# Patient Record
Sex: FEMALE | Race: WHITE | NOT HISPANIC OR LATINO | Employment: OTHER | URBAN - METROPOLITAN AREA
[De-identification: names, ages, dates, MRNs, and addresses within clinical notes are randomized per-mention and may not be internally consistent; named-entity substitution may affect disease eponyms.]

---

## 2017-05-21 ENCOUNTER — TELEPHONE (OUTPATIENT)
Dept: MEDICAL GROUP | Facility: PHYSICIAN GROUP | Age: 82
End: 2017-05-21

## 2017-05-22 NOTE — TELEPHONE ENCOUNTER
"I was asked by the patient to stop by her home today to do paperwork certifying that she was still of sound mind and able to make her own legal, medical, and financial decisions.  I found her to be alert, oriented and quite cognizant of my visit.  She agreed with my assessment and review of her medications, and said she was feeling \"fine\".  She still has her sense of humor and told me she wanted to make changes in her Will so that all of her assets would be given to various charities of her choice upon her death.   She is of sound mind and is competent and capable of making her own legal, medical, and financial decisions.  I completed paperwork for her to this effect today.  It was a delight to see Katerina again.    Yoni Marino M.D.        "

## 2017-07-29 ENCOUNTER — APPOINTMENT (OUTPATIENT)
Dept: RADIOLOGY | Facility: MEDICAL CENTER | Age: 82
DRG: 689 | End: 2017-07-29
Attending: EMERGENCY MEDICINE
Payer: MEDICARE

## 2017-07-29 ENCOUNTER — RESOLUTE PROFESSIONAL BILLING HOSPITAL PROF FEE (OUTPATIENT)
Dept: HOSPITALIST | Facility: MEDICAL CENTER | Age: 82
End: 2017-07-29
Payer: MEDICARE

## 2017-07-29 ENCOUNTER — HOSPITAL ENCOUNTER (INPATIENT)
Facility: MEDICAL CENTER | Age: 82
LOS: 3 days | DRG: 689 | End: 2017-08-01
Attending: EMERGENCY MEDICINE | Admitting: HOSPITALIST
Payer: MEDICARE
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
## 2017-07-29 DIAGNOSIS — R55 SYNCOPE, UNSPECIFIED SYNCOPE TYPE: ICD-10-CM

## 2017-07-29 PROBLEM — N39.0 UTI (URINARY TRACT INFECTION): Status: ACTIVE | Noted: 2017-07-29

## 2017-07-29 LAB
ALBUMIN SERPL BCP-MCNC: 3.9 G/DL (ref 3.2–4.9)
ALBUMIN/GLOB SERPL: 1.4 G/DL
ALP SERPL-CCNC: 67 U/L (ref 30–99)
ALT SERPL-CCNC: 15 U/L (ref 2–50)
ANION GAP SERPL CALC-SCNC: 6 MMOL/L (ref 0–11.9)
APPEARANCE UR: CLEAR
APTT PPP: 23.3 SEC (ref 24.7–36)
AST SERPL-CCNC: 17 U/L (ref 12–45)
BACTERIA #/AREA URNS HPF: NEGATIVE /HPF
BASOPHILS # BLD AUTO: 0.4 % (ref 0–1.8)
BASOPHILS # BLD: 0.04 K/UL (ref 0–0.12)
BILIRUB SERPL-MCNC: 0.4 MG/DL (ref 0.1–1.5)
BILIRUB UR QL STRIP.AUTO: NEGATIVE
BUN SERPL-MCNC: 34 MG/DL (ref 8–22)
CALCIUM SERPL-MCNC: 9.5 MG/DL (ref 8.5–10.5)
CHLORIDE SERPL-SCNC: 104 MMOL/L (ref 96–112)
CO2 SERPL-SCNC: 24 MMOL/L (ref 20–33)
COLOR UR: YELLOW
CREAT SERPL-MCNC: 1.08 MG/DL (ref 0.5–1.4)
CULTURE IF INDICATED INDCX: YES UA CULTURE
EKG IMPRESSION: NORMAL
EOSINOPHIL # BLD AUTO: 0.2 K/UL (ref 0–0.51)
EOSINOPHIL NFR BLD: 1.9 % (ref 0–6.9)
EPI CELLS #/AREA URNS HPF: ABNORMAL /HPF
ERYTHROCYTE [DISTWIDTH] IN BLOOD BY AUTOMATED COUNT: 44.5 FL (ref 35.9–50)
GFR SERPL CREATININE-BSD FRML MDRD: 47 ML/MIN/1.73 M 2
GLOBULIN SER CALC-MCNC: 2.8 G/DL (ref 1.9–3.5)
GLUCOSE SERPL-MCNC: 91 MG/DL (ref 65–99)
GLUCOSE UR STRIP.AUTO-MCNC: NEGATIVE MG/DL
HCT VFR BLD AUTO: 45.6 % (ref 37–47)
HGB BLD-MCNC: 15 G/DL (ref 12–16)
HYALINE CASTS #/AREA URNS LPF: ABNORMAL /LPF
IMM GRANULOCYTES # BLD AUTO: 0.05 K/UL (ref 0–0.11)
IMM GRANULOCYTES NFR BLD AUTO: 0.5 % (ref 0–0.9)
KETONES UR STRIP.AUTO-MCNC: NEGATIVE MG/DL
LACTATE BLD-SCNC: 1.5 MMOL/L (ref 0.5–2)
LEUKOCYTE ESTERASE UR QL STRIP.AUTO: ABNORMAL
LYMPHOCYTES # BLD AUTO: 3.26 K/UL (ref 1–4.8)
LYMPHOCYTES NFR BLD: 31.5 % (ref 22–41)
MAGNESIUM SERPL-MCNC: 2.2 MG/DL (ref 1.5–2.5)
MCH RBC QN AUTO: 31.1 PG (ref 27–33)
MCHC RBC AUTO-ENTMCNC: 32.9 G/DL (ref 33.6–35)
MCV RBC AUTO: 94.4 FL (ref 81.4–97.8)
MICRO URNS: ABNORMAL
MONOCYTES # BLD AUTO: 0.88 K/UL (ref 0–0.85)
MONOCYTES NFR BLD AUTO: 8.5 % (ref 0–13.4)
NEUTROPHILS # BLD AUTO: 5.93 K/UL (ref 2–7.15)
NEUTROPHILS NFR BLD: 57.2 % (ref 44–72)
NITRITE UR QL STRIP.AUTO: NEGATIVE
NRBC # BLD AUTO: 0 K/UL
NRBC BLD AUTO-RTO: 0 /100 WBC
PH UR STRIP.AUTO: 7 [PH]
PHOSPHATE SERPL-MCNC: 3.1 MG/DL (ref 2.5–4.5)
PLATELET # BLD AUTO: 254 K/UL (ref 164–446)
PMV BLD AUTO: 9.2 FL (ref 9–12.9)
POTASSIUM SERPL-SCNC: 3.8 MMOL/L (ref 3.6–5.5)
PROT SERPL-MCNC: 6.7 G/DL (ref 6–8.2)
PROT UR QL STRIP: NEGATIVE MG/DL
RBC # BLD AUTO: 4.83 M/UL (ref 4.2–5.4)
RBC # URNS HPF: ABNORMAL /HPF
RBC UR QL AUTO: ABNORMAL
SODIUM SERPL-SCNC: 134 MMOL/L (ref 135–145)
SP GR UR STRIP.AUTO: 1.02
TROPONIN I SERPL-MCNC: 0.01 NG/ML (ref 0–0.04)
UROBILINOGEN UR STRIP.AUTO-MCNC: 0.2 MG/DL
WBC # BLD AUTO: 10.4 K/UL (ref 4.8–10.8)
WBC #/AREA URNS HPF: ABNORMAL /HPF

## 2017-07-29 PROCEDURE — 70450 CT HEAD/BRAIN W/O DYE: CPT

## 2017-07-29 PROCEDURE — 83735 ASSAY OF MAGNESIUM: CPT

## 2017-07-29 PROCEDURE — 87086 URINE CULTURE/COLONY COUNT: CPT

## 2017-07-29 PROCEDURE — 81001 URINALYSIS AUTO W/SCOPE: CPT

## 2017-07-29 PROCEDURE — 36415 COLL VENOUS BLD VENIPUNCTURE: CPT

## 2017-07-29 PROCEDURE — 93005 ELECTROCARDIOGRAM TRACING: CPT | Performed by: EMERGENCY MEDICINE

## 2017-07-29 PROCEDURE — 85025 COMPLETE CBC W/AUTO DIFF WBC: CPT

## 2017-07-29 PROCEDURE — 85730 THROMBOPLASTIN TIME PARTIAL: CPT

## 2017-07-29 PROCEDURE — 83605 ASSAY OF LACTIC ACID: CPT

## 2017-07-29 PROCEDURE — 84100 ASSAY OF PHOSPHORUS: CPT

## 2017-07-29 PROCEDURE — 71010 DX-CHEST-LIMITED (1 VIEW): CPT

## 2017-07-29 PROCEDURE — 84484 ASSAY OF TROPONIN QUANT: CPT

## 2017-07-29 PROCEDURE — 770020 HCHG ROOM/CARE - TELE (206)

## 2017-07-29 PROCEDURE — 99223 1ST HOSP IP/OBS HIGH 75: CPT | Performed by: HOSPITALIST

## 2017-07-29 PROCEDURE — 73502 X-RAY EXAM HIP UNI 2-3 VIEWS: CPT | Mod: LT

## 2017-07-29 PROCEDURE — 72125 CT NECK SPINE W/O DYE: CPT

## 2017-07-29 PROCEDURE — 99285 EMERGENCY DEPT VISIT HI MDM: CPT

## 2017-07-29 PROCEDURE — 80053 COMPREHEN METABOLIC PANEL: CPT

## 2017-07-29 RX ORDER — LISINOPRIL 20 MG/1
20 TABLET ORAL
Status: DISCONTINUED | OUTPATIENT
Start: 2017-07-30 | End: 2017-08-01 | Stop reason: HOSPADM

## 2017-07-29 RX ORDER — ONDANSETRON 4 MG/1
4 TABLET, ORALLY DISINTEGRATING ORAL EVERY 4 HOURS PRN
Status: DISCONTINUED | OUTPATIENT
Start: 2017-07-29 | End: 2017-08-01 | Stop reason: HOSPADM

## 2017-07-29 RX ORDER — CALCIUM POLYCARBOPHIL 625 MG
1 TABLET ORAL DAILY
Status: DISCONTINUED | OUTPATIENT
Start: 2017-07-30 | End: 2017-07-29

## 2017-07-29 RX ORDER — POLYETHYLENE GLYCOL 3350 17 G/17G
1 POWDER, FOR SOLUTION ORAL
Status: DISCONTINUED | OUTPATIENT
Start: 2017-07-29 | End: 2017-08-01 | Stop reason: HOSPADM

## 2017-07-29 RX ORDER — ACETAMINOPHEN 325 MG/1
650 TABLET ORAL EVERY 6 HOURS PRN
Status: DISCONTINUED | OUTPATIENT
Start: 2017-07-29 | End: 2017-08-01 | Stop reason: HOSPADM

## 2017-07-29 RX ORDER — AMLODIPINE BESYLATE 5 MG/1
2.5 TABLET ORAL
Status: DISCONTINUED | OUTPATIENT
Start: 2017-07-30 | End: 2017-08-01 | Stop reason: HOSPADM

## 2017-07-29 RX ORDER — SODIUM CHLORIDE 9 MG/ML
INJECTION, SOLUTION INTRAVENOUS CONTINUOUS
Status: DISCONTINUED | OUTPATIENT
Start: 2017-07-29 | End: 2017-07-31

## 2017-07-29 RX ORDER — BISACODYL 10 MG
10 SUPPOSITORY, RECTAL RECTAL
Status: DISCONTINUED | OUTPATIENT
Start: 2017-07-29 | End: 2017-08-01 | Stop reason: HOSPADM

## 2017-07-29 RX ORDER — ONDANSETRON 2 MG/ML
4 INJECTION INTRAMUSCULAR; INTRAVENOUS EVERY 4 HOURS PRN
Status: DISCONTINUED | OUTPATIENT
Start: 2017-07-29 | End: 2017-08-01 | Stop reason: HOSPADM

## 2017-07-29 RX ORDER — AMOXICILLIN 250 MG
2 CAPSULE ORAL 2 TIMES DAILY
Status: DISCONTINUED | OUTPATIENT
Start: 2017-07-30 | End: 2017-08-01 | Stop reason: HOSPADM

## 2017-07-29 RX ORDER — LORAZEPAM 2 MG/ML
0.5 INJECTION INTRAMUSCULAR EVERY 6 HOURS PRN
Status: DISCONTINUED | OUTPATIENT
Start: 2017-07-29 | End: 2017-07-31

## 2017-07-29 RX ORDER — LORAZEPAM 1 MG/1
0.5 TABLET ORAL EVERY 6 HOURS PRN
Status: DISCONTINUED | OUTPATIENT
Start: 2017-07-29 | End: 2017-07-31

## 2017-07-29 ASSESSMENT — PAIN SCALES - GENERAL
PAINLEVEL_OUTOF10: 0

## 2017-07-29 NOTE — ED NOTES
"Pt bib ems from home.  Chief Complaint   Patient presents with   • Syncope     Pt states she walking in the kitchen and \"passed out and fell backwards\" reports she hit her head on the floor   • Hip Pain     left     Pt denies a prior episode of the same. Pt arrives in a c-collar and on a back board. Pt log rolled of backboard. C-collar remains in place.  Pt DOOLEY. Denies numbness/tingling.  Pt in a gown, connected to monitor, chart up for ERP.  "

## 2017-07-29 NOTE — IP AVS SNAPSHOT
Piqniq Access Code: [email or web address]  Expires: 8/31/2017 12:59 PM    Your email address is not on file at [facility].  Email Addresses are required for you to sign up for Piqniq, please contact [phone] to verify your personal information and to provide your email address prior to attempting to register for Piqniq.    [place] [address]    Piqniq  A secure, online tool to manage your health information     [facility]’s Piqniq® is a secure, online tool that connects you to your personalized health information from the privacy of your home -- day or night - making it very easy for you to manage your healthcare. Once the activation process is completed, you can even access your medical information using the Piqniq gwendolyn, which is available for free in the Apple Gwendolyn store or Google Play store.     To learn more about Piqniq, visit [email or web address]    There are two levels of access available (as shown below):   My Chart Features  [facility] Primary Care Doctor [facility]  Specialists [facility]  Urgent  Care Non-[facility] Primary Care Doctor   Email your healthcare team securely and privately 24/7 X X X    Manage appointments: schedule your next appointment; view details of past/upcoming appointments X      Request prescription refills. X      View recent personal medical records, including lab and immunizations X X X X   View health record, including health history, allergies, medications X X X X   Read reports about your outpatient visits, procedures, consult and ER notes X X X X   See your discharge summary, which is a recap of your hospital and/or ER visit that includes your diagnosis, lab results, and care plan X X  X     How to register for Piqniq:  Once your e-mail address has been verified, follow the following steps to sign up for Piqniq.     1. Go to  [email or web address]  2. Click on the Sign Up Now box, which takes you to the New Member Sign Up page. You will  need to provide the following information:  a. Enter your [facility] Access Code exactly as it appears at the top of this page. (You will not need to use this code after you’ve completed the sign-up process. If you do not sign up before the expiration date, you must request a new code.)   b. Enter your date of birth.   c. Enter your home email address.   d. Click Submit, and follow the next screen’s instructions.  3. Create a [employer] ID. This will be your [facility] login ID and cannot be changed, so think of one that is secure and easy to remember.  4. Create a [facility] password. You can change your password at any time.  5. Enter your Password Reset Question and Answer. This can be used at a later time if you forget your password.   6. Enter your e-mail address. This allows you to receive e-mail notifications when new information is available in [facility].  7. Click Sign Up. You can now view your health information.    For assistance activating your [facility] account, call [phone]

## 2017-07-29 NOTE — IP AVS SNAPSHOT
8/1/2017    Katerina Joseph  [address]  [facility] [address]    Dear Katerina:    [facility] wants to ensure your discharge home is safe and you or your loved ones have had all of your questions answered regarding your care after you leave the hospital.    Below is a list of resources and contact information should you have any questions regarding your hospital stay, follow-up instructions, or active medical symptoms.    Questions or Concerns Regarding… Contact   Medical Questions Related to Your Discharge  (7 days a week, 8am-5pm) Contact a Nurse Care Coordinator   [phone]   Medical Questions Not Related to Your Discharge  (24 hours a day / 7 days a week)  Contact the Nurse Health Line   [phone]    Medications or Discharge Instructions Refer to your discharge packet   or contact your [facility] Primary Care Provider   [phone]   Follow-up Appointment(s) Schedule your appointment via [employer]   or contact Scheduling [phone]   Billing Review your statement via [employer]  or contact Billing [phone]   Medical Records Review your records via [employer]   or contact Medical Records [phone]     You may receive a telephone call within two days of discharge. This call is to make certain you understand your discharge instructions and have the opportunity to have any questions answered. You can also easily access your medical information, test results and upcoming appointments via the [employer] free online health management tool. You can learn more and sign up at [employer]. For assistance setting up your [employer] [facility] [employer] account, please call [phone].    Once again, we want to ensure your discharge home is safe and that you have a clear understanding of any next steps in your care. If you have any questions or concerns, please do not hesitate to contact us, we are here for you. Thank you for choosing [facility] for your healthcare needs.    Sincerely,    Your [facility] Healthcare Team

## 2017-07-29 NOTE — IP AVS SNAPSHOT
" <p align=\"LEFT\"><IMG SRC=\"//[email or web address]\" alt=\"Image\" WIDTH=\"50%\" HEIGHT=\"200\" BORDER=\"\"></p>                   Name:Katerina Joseph  Medical Record Number:[id number]  CSN: [id number]    YOB: 1919   Age: 89 y.o.  Sex: female  HT:1.702 m (5' 7\") WT: 78.9 kg (173 lb 15.1 oz)          Admit Date: 7/29/2017     Discharge Date:   Today's Date: 8/1/2017  Attending Doctor:  Shasta Simeon M.D.                  Allergies:  Review of patient's allergies indicates no known allergies.          Your appointments     Aug 02, 2017  2:40 PM   CARE MANAGER TELEPHONE VISIT with CARE MANAGER   [address] [id number] [phone]           ***IMPORTANT**** This is a phone visit only. Do not come into the office. The Care Manager will call you at the scheduled time for Care Manager Telephone Visit.            Aug 03, 2017  1:40 PM   Established Patient with Ally Macdonald M.D.   [address] [address]   [phone]           You will be receiving a confirmation call a few days before your appointment from our automated call confirmation system.                 Medication List      Take these Medications        Instructions    acetaminophen 325 MG Tabs   Commonly known as:  TYLENOL    Take 650 mg by mouth every four hours as needed.    Indications: Pain   Dose:  650 mg       amlodipine 2.5 MG Tabs   Commonly known as:  NORVASC    TAKE 1 TABLET BY MOUTH ONCE DAILY.       aspirin EC 81 MG Tbec   Commonly known as:  ECOTRIN    Take 81 mg by mouth every day.   Dose:  81 mg       Calcium Carbonate 600 MG Tabs    Take  by mouth.       cefdinir 300 MG Caps   Commonly known as:  OMNICEF    Take 1 Cap by mouth 2 times a day for 2 days.   Dose:  300 mg       CENTRUM ADULTS PO    Take  by mouth.       docusate sodium 100 MG Caps   Commonly known as:  COLACE    Take 100 mg by mouth 2 times a day.   Dose:  " 100 mg       Fiber 625 MG Tabs    Take 1 Tab by mouth every day.   Dose:  1 Tab       hydrochlorothiazide 25 MG Tabs   Commonly known as:  HYDRODIURIL    TAKE 1 TABLET BY MOUTH ONCE DAILY.       lisinopril 20 MG Tabs   Commonly known as:  PRINIVIL    TAKE (1) TABLET BY MOUTH TWICE DAILY.       magnesium hydroxide 400 MG/5ML Susp   Commonly known as:  MILK OF MAGNESIA    Take 30 mL by mouth 1 time daily as needed. For constipation.   Dose:  30 mL       pantoprazole 40 MG Tbec   Commonly known as:  PROTONIX    Take 80 mg by mouth every evening.   Dose:  80 mg       potassium chloride SA 20 MEQ Tbcr   Commonly known as:  Kdur    Take 20 mEq by mouth every day.   Dose:  20 mEq       Vitamin D3 3000 UNITS Tabs    Take 1,000 Units by mouth every day.   Dose:  1000 Units

## 2017-07-29 NOTE — IP AVS SNAPSHOT
" Home Care Instructions                                                                                                                  Name:Katerina Joseph  Medical Record Number:[id number]  CSN: [id number]    YOB: 1919   Age: 89 y.o.  Sex: female  HT:1.702 m (5' 7\") WT: 78.9 kg (173 lb 15.1 oz)          Admit Date: 7/29/2017     Discharge Date:   Today's Date: 8/1/2017  Attending Doctor:  Shasta Simeon M.D.                  Allergies:  Review of patient's allergies indicates no known allergies.            Discharge Instructions       Discharge Instructions    Discharged to home by car with friend. Discharged via wheelchair, hospital escort: Yes.  Special equipment needed: Not Applicable    Be sure to schedule a follow-up appointment with your primary care doctor or any specialists as instructed.     Discharge Plan:   Diet Plan: Discussed  Activity Level: Discussed  Confirmed Follow up Appointment: Appointment Scheduled  Confirmed Symptoms Management: Discussed  Medication Reconciliation Updated: Yes  Influenza Vaccine Indication: Patient Refuses    I understand that a diet low in cholesterol, fat, and sodium is recommended for good health. Unless I have been given specific instructions below for another diet, I accept this instruction as my diet prescription.   Other diet: low sodium, low cholesterol    Special Instructions: None    · Is patient discharged on Warfarin / Coumadin?   No     · Is patient Post Blood Transfusion?  No    Depression / Suicide Risk    As you are discharged from this [facility] facility, it is important to learn how to keep safe from harming yourself.    Recognize the warning signs:  · Abrupt changes in personality, positive or negative- including increase in energy   · Giving away possessions  · Change in eating patterns- significant weight changes-  positive or negative  · Change in sleeping patterns- unable to sleep or sleeping all the time   · Unwillingness or inability to " communicate  · Depression  · Unusual sadness, discouragement and loneliness  · Talk of wanting to die  · Neglect of personal appearance   · Rebelliousness- reckless behavior  · Withdrawal from people/activities they love  · Confusion- inability to concentrate     If you or a loved one observes any of these behaviors or has concerns about self-harm, here's what you can do:  · Talk about it- your feelings and reasons for harming yourself  · Remove any means that you might use to hurt yourself (examples: pills, rope, extension cords, firearm)  · Get professional help from the community (Mental Health, Substance Abuse, psychological counseling)  · Do not be alone:Call your Safe Contact- someone whom you trust who will be there for you.  · Call your local CRISIS HOTLINE 531-6751 or 261-730-5937  · Call your local Children's Mobile Crisis Response Team [place] (689) 702-0241 or www.DUQI.COM  · Call the toll free National Suicide Prevention Hotlines   · National Suicide Prevention Lifeline 035-984-FHUP (1122)  · [employer] ([place] Hope Line Network 800-SUICIDE (115-8496)      Cefdinir capsules  What is this medicine?  CEFDINIR (SEF di ner) is a cephalosporin antibiotic. It is used to treat certain kinds of bacterial infections. It will not work for colds, flu, or other viral infections.  This medicine may be used for other purposes; ask your health care provider or pharmacist if you have questions.  COMMON BRAND NAME(S): Omnicef  What should I tell my health care provider before I take this medicine?  They need to know if you have any of these conditions:  -bleeding problems  -kidney disease  -stomach or intestine problems (especially colitis)  -an unusual or allergic reaction to cefdinir, other cephalosporin antibiotics, penicillin, penicillamine, other foods, dyes or preservatives  -pregnant or trying to get pregnant  -breast-feeding  How should I use this medicine?  Take this medicine by mouth. Swallow it with a  drink of water. Follow the directions on the prescription label. You can take it with or without food. If it upsets your stomach it may help to take it with food. Take your doses at regular intervals. Do not take it more often than directed. Finish all the medicine you are prescribed even if you think your infection is better.  Talk to your pediatrician regarding the use of this medicine in children. Special care may be needed.  Overdosage: If you think you have taken too much of this medicine contact a poison control center or emergency room at once.  NOTE: This medicine is only for you. Do not share this medicine with others.  What if I miss a dose?  If you miss a dose, take it as soon as you can. If it is almost time for your next dose, take only that dose. Do not take double or extra doses.  What may interact with this medicine?  -antacids that contain aluminum or magnesium  -iron supplements  -other antibiotics  -probenecid  This list may not describe all possible interactions. Give your health care provider a list of all the medicines, herbs, non-prescription drugs, or dietary supplements you use. Also tell them if you smoke, drink alcohol, or use illegal drugs. Some items may interact with your medicine.  What should I watch for while using this medicine?  Tell your doctor or health care professional if your symptoms do not get better in a few days.  If you are diabetic you may get a false-positive result for sugar in your urine. Check with your doctor or health care professional before you change your diet or the dose of your diabetes medicine.  What side effects may I notice from receiving this medicine?  Side effects that you should report to your doctor or health care professional as soon as possible:  -allergic reactions like skin rash, itching or hives, swelling of the face, lips, or tongue  -breathing problems  -fever or chills  -redness, blistering, peeling or loosening of the skin, including inside  the mouth  -seizures  -severe or watery diarrhea  -sore throat  -swollen joints  -trouble passing urine or change in the amount of urine  -unusual bleeding or bruising  -unusually weak or tired  Side effects that usually do not require medical attention (report to your doctor or health care professional if they continue or are bothersome):  -constipation  -dizziness  -gas or heartburn  -headache  -loss of appetite  -nausea or vomiting  -stomach pain  -stool discoloration  -vaginal itching  This list may not describe all possible side effects. Call your doctor for medical advice about side effects. You may report side effects to FDA at 3-637-PVQ-0346.  Where should I keep my medicine?  Keep out of the reach of children.  Store at room temperature between 15 and 30 degrees C (59 and 86 degrees F). Throw the medicine away after the expiration date.  NOTE: This sheet is a summary. It may not cover all possible information. If you have questions about this medicine, talk to your doctor, pharmacist, or health care provider.  © 2014, [employer]. (2/27/2009 4:02:53 PM)      Your appointments     Aug 02, 2017  2:40 PM   CARE MANAGER TELEPHONE VISIT with CARE MANAGER   [address] [address]   [phone]           ***IMPORTANT**** This is a phone visit only. Do not come into the office. The Care Manager will call you at the scheduled time for Care Manager Telephone Visit.            Aug 03, 2017  1:40 PM   Established Patient with Ally Macdonald M.D.   [address]  [facility] [address]   [phone]           You will be receiving a confirmation call a few days before your appointment from our automated call confirmation system.                 Discharge Medication Instructions:    Below are the medications your physician expects you to take upon discharge:    Review all your home medications and newly  ordered medications with your doctor and/or pharmacist. Follow medication instructions as directed by your doctor and/or pharmacist.    Please keep your medication list with you and share with your physician.               Medication List      START taking these medications        Instructions    Morning Afternoon Evening Bedtime    cefdinir 300 MG Caps   Commonly known as:  OMNICEF   Next Dose Due:  Tonight 8/1 before bed.         Take 1 Cap by mouth 2 times a day for 2 days.   Dose:  300 mg                          CONTINUE taking these medications        Instructions    Morning Afternoon Evening Bedtime    acetaminophen 325 MG Tabs   Last time this was given:  650 mg on 7/30/2017  9:17 PM   Commonly known as:  TYLENOL   Next Dose Due:  As needed for pain every 4 hours          Take 650 mg by mouth every four hours as needed.    Indications: Pain   Dose:  650 mg                        amlodipine 2.5 MG Tabs   Last time this was given:  2.5 mg on 8/1/2017  9:20 AM   Commonly known as:  NORVASC   Next Dose Due:  Tomorrow 8/2 in am.         TAKE 1 TABLET BY MOUTH ONCE DAILY.                        aspirin EC 81 MG Tbec   Last time this was given:  81 mg on 8/1/2017  9:20 AM   Commonly known as:  ECOTRIN   Next Dose Due:  Tomorrow 8/2 in am.         Take 81 mg by mouth every day.   Dose:  81 mg                        Calcium Carbonate 600 MG Tabs   Next Dose Due:  Tomorrow 8/2 in am.         Take  by mouth.                        CENTRUM ADULTS PO   Next Dose Due:  Tomorrow 8/2 in am.         Take  by mouth.                        docusate sodium 100 MG Caps   Commonly known as:  COLACE   Next Dose Due:  Tonight 8/2 before bed.         Take 100 mg by mouth 2 times a day.   Dose:  100 mg                        Fiber 625 MG Tabs   Next Dose Due:  Tomorrow 8/2 in am.         Take 1 Tab by mouth every day.   Dose:  1 Tab                        hydrochlorothiazide 25 MG Tabs   Commonly known as:  HYDRODIURIL   Next Dose  Due:  Tomorrow 8/2 in am.         TAKE 1 TABLET BY MOUTH ONCE DAILY.                        lisinopril 20 MG Tabs   Last time this was given:  20 mg on 8/1/2017  9:20 AM   Commonly known as:  PRINIVIL   Next Dose Due:  Tomorrow 8/2 in am.         TAKE (1) TABLET BY MOUTH TWICE DAILY.                        magnesium hydroxide 400 MG/5ML Susp   Commonly known as:  MILK OF MAGNESIA   Next Dose Due:  Tomorrow if needed for constipation. 8/2        Take 30 mL by mouth 1 time daily as needed. For constipation.   Dose:  30 mL                        pantoprazole 40 MG Tbec   Commonly known as:  PROTONIX   Next Dose Due:  Tonight 8/1 before bed.         Take 80 mg by mouth every evening.   Dose:  80 mg                        potassium chloride SA 20 MEQ Tbcr   Commonly known as:  Kdur   Next Dose Due:  Tomorrow 8/2 in am.         Take 20 mEq by mouth every day.   Dose:  20 mEq                        Vitamin D3 3000 UNITS Tabs   Next Dose Due:  Tomorrow 8/2 in am.         Take 1,000 Units by mouth every day.   Dose:  1000 Units                             Where to Get Your Medications      These medications were sent to [facility] [place] [address] [address] [facility] [address] [facility] [address]     Phone:  [phone]    - cefdinir 300 MG Caps            Orders for after discharge     REFERRAL TO HOME HEALTH    Complete by:  As directed    Home health will create and establish a plan of care             Instructions           Diet / Nutrition:    Follow any diet instructions given to you by your doctor or the dietician, including how much salt (sodium) you are allowed each day.    If you are overweight, talk to your doctor about a weight reduction plan.    Activity:    Remain physically active following your doctor's instructions about exercise and activity.    Rest often.     Any time you become even a little tired or short of breath, SIT DOWN and rest.    Worsening Symptoms:    Report any of the  following signs and symptoms to the doctor's office immediately:    *Pain of jaw, arm, or neck  *Chest pain not relieved by medication                               *Dizziness or loss of consciousness  *Difficulty breathing even when at rest   *More tired than usual                                       *Bleeding drainage or swelling of surgical site  *Swelling of feet, ankles, legs or stomach                 *Fever (>100ºF)  *Pink or blood tinged sputum  *Weight gain (3lbs/day or 5lbs /week)           *Shock from internal defibrillator (if applicable)  *Palpitations or irregular heartbeats                *Cool and/or numb extremities    Stroke Awareness    Common Risk Factors for Stroke include:    Age  Atrial Fibrillation  Carotid Artery Stenosis  Diabetes Mellitus  Excessive alcohol consumption  High blood pressure  Overweight   Physical inactivity  Smoking    Warning signs and symptoms of a stroke include:    *Sudden numbness or weakness of the face, arm or leg (especially on one side of the body).  *Sudden confusion, trouble speaking or understanding.  *Sudden trouble seeing in one or both eyes.  *Sudden trouble walking, dizziness, loss of balance or coordination.Sudden severe headache with no known cause.    It is very important to get treatment quickly when a stroke occurs. If you experience any of the above warning signs, call 911 immediately.                   Disclaimer         Quit Smoking / Tobacco Use:    I understand the use of any tobacco products increases my chance of suffering from future heart disease or stroke and could cause other illnesses which may shorten my life. Quitting the use of tobacco products is the single most important thing I can do to improve my health. For further information on smoking / tobacco cessation call a Toll Free Quit Line at 1-235.778.7155 (*National Cancer [place]) or 1-176.943.2134 (American Lung Association) or you can access the web based program at  www.lungusa.org.    [place] Tobacco Users Help Line:  (160) 210-8066       Toll Free: 1-748.924.3330  Quit Tobacco Program [facility] (376)096-5066    Crisis Hotline:    [place] Crisis Hotline:  7-649-STRVFOU or 1-985.883.9443    [place] Crisis Hotline:    1-584.560.3539 or 422-610-0972    Discharge Survey:   Thank you for choosing [facility]. We hope we did everything we could to make your hospital stay a pleasant one. You may be receiving a phone survey and we would appreciate your time and participation in answering the questions. Your input is very valuable to us in our efforts to improve our service to our patients and their families.        My signature on this form indicates that:    1. I have reviewed and understand the above information.  2. My questions regarding this information have been answered to my satisfaction.  3. I have formulated a plan with my discharge nurse to obtain my prescribed medications for home.                  Disclaimer         __________________________________                     __________       ________                       Patient Signature                                                 Date                    Time

## 2017-07-30 PROBLEM — M25.559 HIP PAIN, ACUTE: Status: ACTIVE | Noted: 2017-07-30

## 2017-07-30 LAB
ANION GAP SERPL CALC-SCNC: 8 MMOL/L (ref 0–11.9)
BASOPHILS # BLD AUTO: 0.7 % (ref 0–1.8)
BASOPHILS # BLD: 0.05 K/UL (ref 0–0.12)
BUN SERPL-MCNC: 26 MG/DL (ref 8–22)
CALCIUM SERPL-MCNC: 8.7 MG/DL (ref 8.5–10.5)
CHLORIDE SERPL-SCNC: 103 MMOL/L (ref 96–112)
CO2 SERPL-SCNC: 23 MMOL/L (ref 20–33)
COMMENT 1642: NORMAL
CREAT SERPL-MCNC: 1.14 MG/DL (ref 0.5–1.4)
EOSINOPHIL # BLD AUTO: 0.31 K/UL (ref 0–0.51)
EOSINOPHIL NFR BLD: 4.3 % (ref 0–6.9)
ERYTHROCYTE [DISTWIDTH] IN BLOOD BY AUTOMATED COUNT: 45.3 FL (ref 35.9–50)
GFR SERPL CREATININE-BSD FRML MDRD: 44 ML/MIN/1.73 M 2
GLUCOSE SERPL-MCNC: 101 MG/DL (ref 65–99)
HCT VFR BLD AUTO: 44.6 % (ref 37–47)
HGB BLD-MCNC: 14.6 G/DL (ref 12–16)
IMM GRANULOCYTES # BLD AUTO: 0.03 K/UL (ref 0–0.11)
IMM GRANULOCYTES NFR BLD AUTO: 0.4 % (ref 0–0.9)
LYMPHOCYTES # BLD AUTO: 2.29 K/UL (ref 1–4.8)
LYMPHOCYTES NFR BLD: 31.5 % (ref 22–41)
MCH RBC QN AUTO: 31.1 PG (ref 27–33)
MCHC RBC AUTO-ENTMCNC: 32.7 G/DL (ref 33.6–35)
MCV RBC AUTO: 95.1 FL (ref 81.4–97.8)
MONOCYTES # BLD AUTO: 0.7 K/UL (ref 0–0.85)
MONOCYTES NFR BLD AUTO: 9.6 % (ref 0–13.4)
MORPHOLOGY BLD-IMP: NORMAL
NEUTROPHILS # BLD AUTO: 3.89 K/UL (ref 2–7.15)
NEUTROPHILS NFR BLD: 53.5 % (ref 44–72)
NRBC # BLD AUTO: 0 K/UL
NRBC BLD AUTO-RTO: 0 /100 WBC
PLATELET # BLD AUTO: 132 K/UL (ref 164–446)
PMV BLD AUTO: 11 FL (ref 9–12.9)
POTASSIUM SERPL-SCNC: 4.1 MMOL/L (ref 3.6–5.5)
RBC # BLD AUTO: 4.69 M/UL (ref 4.2–5.4)
SODIUM SERPL-SCNC: 134 MMOL/L (ref 135–145)
WBC # BLD AUTO: 7.3 K/UL (ref 4.8–10.8)

## 2017-07-30 PROCEDURE — 700102 HCHG RX REV CODE 250 W/ 637 OVERRIDE(OP): Performed by: HOSPITALIST

## 2017-07-30 PROCEDURE — 700111 HCHG RX REV CODE 636 W/ 250 OVERRIDE (IP): Performed by: HOSPITALIST

## 2017-07-30 PROCEDURE — 770020 HCHG ROOM/CARE - TELE (206)

## 2017-07-30 PROCEDURE — 700105 HCHG RX REV CODE 258: Performed by: HOSPITALIST

## 2017-07-30 PROCEDURE — 85025 COMPLETE CBC W/AUTO DIFF WBC: CPT

## 2017-07-30 PROCEDURE — A9270 NON-COVERED ITEM OR SERVICE: HCPCS | Performed by: HOSPITALIST

## 2017-07-30 PROCEDURE — 99233 SBSQ HOSP IP/OBS HIGH 50: CPT | Performed by: HOSPITALIST

## 2017-07-30 PROCEDURE — 36415 COLL VENOUS BLD VENIPUNCTURE: CPT

## 2017-07-30 PROCEDURE — 80048 BASIC METABOLIC PNL TOTAL CA: CPT

## 2017-07-30 RX ADMIN — ACETAMINOPHEN 650 MG: 325 TABLET, FILM COATED ORAL at 07:08

## 2017-07-30 RX ADMIN — LISINOPRIL 20 MG: 20 TABLET ORAL at 08:55

## 2017-07-30 RX ADMIN — ACETAMINOPHEN 325 MG: 325 TABLET, FILM COATED ORAL at 15:22

## 2017-07-30 RX ADMIN — AMLODIPINE BESYLATE 2.5 MG: 5 TABLET ORAL at 08:55

## 2017-07-30 RX ADMIN — ASPIRIN 81 MG: 81 TABLET ORAL at 08:55

## 2017-07-30 RX ADMIN — CEFTRIAXONE 2 G: 2 INJECTION, POWDER, FOR SOLUTION INTRAMUSCULAR; INTRAVENOUS at 00:01

## 2017-07-30 RX ADMIN — SODIUM CHLORIDE: 9 INJECTION, SOLUTION INTRAVENOUS at 17:55

## 2017-07-30 RX ADMIN — CEFTRIAXONE 2 G: 2 INJECTION, POWDER, FOR SOLUTION INTRAMUSCULAR; INTRAVENOUS at 21:18

## 2017-07-30 RX ADMIN — ENOXAPARIN SODIUM 40 MG: 100 INJECTION SUBCUTANEOUS at 08:55

## 2017-07-30 RX ADMIN — SODIUM CHLORIDE: 9 INJECTION, SOLUTION INTRAVENOUS at 00:01

## 2017-07-30 RX ADMIN — ACETAMINOPHEN 650 MG: 325 TABLET, FILM COATED ORAL at 21:17

## 2017-07-30 ASSESSMENT — ENCOUNTER SYMPTOMS
CONSTITUTIONAL NEGATIVE: 1
FEVER: 0
INSOMNIA: 0
NERVOUS/ANXIOUS: 0
RESPIRATORY NEGATIVE: 1
FLANK PAIN: 0
PSYCHIATRIC NEGATIVE: 1
BACK PAIN: 0
LOSS OF CONSCIOUSNESS: 0
NECK PAIN: 0
GASTROINTESTINAL NEGATIVE: 1
MYALGIAS: 0
SORE THROAT: 0
CARDIOVASCULAR NEGATIVE: 1
PALPITATIONS: 0
NAUSEA: 0
CHILLS: 0
ABDOMINAL PAIN: 0
SHORTNESS OF BREATH: 0
WEAKNESS: 0
BLURRED VISION: 0
VOMITING: 0
BRUISES/BLEEDS EASILY: 0
TINGLING: 0
NEUROLOGICAL NEGATIVE: 1
WEIGHT LOSS: 0
DIZZINESS: 0
EYE PAIN: 0
DEPRESSION: 0
HEADACHES: 0
COUGH: 0

## 2017-07-30 ASSESSMENT — COGNITIVE AND FUNCTIONAL STATUS - GENERAL
DAILY ACTIVITIY SCORE: 18
PERSONAL GROOMING: A LITTLE
HELP NEEDED FOR BATHING: A LITTLE
STANDING UP FROM CHAIR USING ARMS: A LOT
TOILETING: A LOT
SUGGESTED CMS G CODE MODIFIER MOBILITY: CK
DRESSING REGULAR UPPER BODY CLOTHING: A LITTLE
MOVING TO AND FROM BED TO CHAIR: A LITTLE
MOVING FROM LYING ON BACK TO SITTING ON SIDE OF FLAT BED: A LITTLE
STANDING UP FROM CHAIR USING ARMS: A LOT
MOVING FROM LYING ON BACK TO SITTING ON SIDE OF FLAT BED: A LITTLE
HELP NEEDED FOR BATHING: A LITTLE
DAILY ACTIVITIY SCORE: 18
CLIMB 3 TO 5 STEPS WITH RAILING: A LOT
SUGGESTED CMS G CODE MODIFIER DAILY ACTIVITY: CK
WALKING IN HOSPITAL ROOM: A LOT
MOBILITY SCORE: 16
WALKING IN HOSPITAL ROOM: A LOT
DRESSING REGULAR LOWER BODY CLOTHING: A LITTLE
TOILETING: A LOT
CLIMB 3 TO 5 STEPS WITH RAILING: A LOT
SUGGESTED CMS G CODE MODIFIER DAILY ACTIVITY: CK
SUGGESTED CMS G CODE MODIFIER MOBILITY: CK
MOBILITY SCORE: 16
MOVING TO AND FROM BED TO CHAIR: A LITTLE
DRESSING REGULAR UPPER BODY CLOTHING: A LITTLE
DRESSING REGULAR LOWER BODY CLOTHING: A LITTLE
PERSONAL GROOMING: A LITTLE

## 2017-07-30 ASSESSMENT — PATIENT HEALTH QUESTIONNAIRE - PHQ9
1. LITTLE INTEREST OR PLEASURE IN DOING THINGS: NOT AT ALL
SUM OF ALL RESPONSES TO PHQ QUESTIONS 1-9: 0
SUM OF ALL RESPONSES TO PHQ9 QUESTIONS 1 AND 2: 0
2. FEELING DOWN, DEPRESSED, IRRITABLE, OR HOPELESS: NOT AT ALL

## 2017-07-30 ASSESSMENT — PAIN SCALES - GENERAL
PAINLEVEL_OUTOF10: 0
PAINLEVEL_OUTOF10: 7
PAINLEVEL_OUTOF10: 0

## 2017-07-30 ASSESSMENT — LIFESTYLE VARIABLES
EVER_SMOKED: NEVER
ALCOHOL_USE: NO

## 2017-07-30 NOTE — ASSESSMENT & PLAN NOTE
Likely due to urinary tract infection and possible mild dehydration from hydrochlorothiazide. Holding this medication and treating urinary tract infection

## 2017-07-30 NOTE — H&P
" Hospital Medicine History and Physical    Date of Service  7/30/2017    Chief Complaint  Chief Complaint   Patient presents with   • Syncope     Pt states she walking in the kitchen and \"passed out and fell backwards\" reports she hit her head on the floor   • Hip Pain     left       History of Presenting Illness  89 y.o. female who presented 7/29/2017 with an acute syncopal episode. She fell onto her left hip and hit her head on the floor. CT of the head and films of the hip here do not show any fracture or bleeding. She has had some urinary incontinence which is nothing unusual for her but it's been a little worse recently. She denies any chest pain or shortness of breath cough or dysuria abdominal pain or diarrhea.    Primary Care Physician  GIL Maki.P.    Consultants  None    Code Status  Full code    Review of Systems  Review of Systems   Constitutional: Negative.  Negative for fever, chills, weight loss and malaise/fatigue.   HENT: Negative.    Respiratory: Negative.  Negative for cough and shortness of breath.    Cardiovascular: Negative.  Negative for chest pain and leg swelling.   Gastrointestinal: Negative.  Negative for nausea, vomiting and abdominal pain.   Genitourinary: Negative.  Negative for dysuria, urgency, frequency, hematuria and flank pain.   Musculoskeletal: Positive for joint pain. Negative for myalgias and back pain.   Neurological: Negative.  Negative for dizziness, loss of consciousness and weakness.   Endo/Heme/Allergies: Negative.  Does not bruise/bleed easily.   Psychiatric/Behavioral: Negative.  Negative for depression. The patient is not nervous/anxious.    All other systems reviewed and are negative.         Past Medical History  Past Medical History   Diagnosis Date   • Cancer    • Hypertension    • Stroke    • Urge incontinence of urine 11/11/2015       Surgical History  Past Surgical History   Procedure Laterality Date   • Colon resection  2000     resection for colon " cancer  Dr Sheets   • Other cardiac surgery     • Stent placement  cardiac stent ?   • Hip cannulated screw  10/15/2012     Performed by Yoni Gill M.D. at SURGERY [facility]S   • Hip orif  2012     left hip       Medications  No current facility-administered medications on file prior to encounter.     Current Outpatient Prescriptions on File Prior to Encounter   Medication Sig Dispense Refill   • Cholecalciferol (VITAMIN D3) 3000 UNITS Tab Take 1,000 Units by mouth every day.     • Multiple Vitamins-Minerals (CENTRUM ADULTS PO) Take  by mouth.     • Calcium Carbonate 600 MG Tab Take  by mouth.     • hydrochlorothiazide (HYDRODIURIL) 25 MG Tab TAKE 1 TABLET BY MOUTH ONCE DAILY. 90 Tab 1   • pantoprazole (PROTONIX) 20 MG tablet Take 1 Tab by mouth every bedtime. 90 Tab 3   • amlodipine (NORVASC) 2.5 MG Tab TAKE 1 TABLET BY MOUTH ONCE DAILY. 90 Tab 3   • lisinopril (PRINIVIL) 20 MG Tab TAKE (1) TABLET BY MOUTH TWICE DAILY. 180 Tab 3   • Calcium Polycarbophil (FIBER) 625 MG TABS Take 1 Tab by mouth every day. 100 Tab 3   • potassium chloride SA (K-DUR) 10 MEQ TBCR Take 10 mEq by mouth every day.       • aspirin EC (ECOTRIN) 81 MG TBEC Take 81 mg by mouth every day.       • acetaminophen (TYLENOL) 325 MG TABS Take 650 mg by mouth every four hours as needed.    Indications: Pain     • docusate sodium (COLACE) 100 MG CAPS Take 100 mg by mouth 2 times a day.     • magnesium hydroxide (MILK OF MAGNESIA) 400 MG/5ML SUSP Take 30 mL by mouth 1 time daily as needed. For constipation.          Family History  No family history on file.    Social History  Social History   Substance Use Topics   • Smoking status: Never Smoker    • Smokeless tobacco: Never Used   • Alcohol Use: No       Allergies  No Known Allergies     Physical Exam  Laboratory   Hemodynamics  Temp (24hrs), Av.4 °C (97.6 °F), Min:36.4 °C (97.5 °F), Max:36.4 °C (97.6 °F)   Temperature: 36.4 °C (97.6 °F)  Pulse  Av.3  Min: 68  Max:  88 Heart Rate (Monitored): 72  Blood Pressure : 142/67 mmHg, NIBP: (!) 177/63 mmHg      Respiratory      Respiration: 17, Pulse Oximetry: 95 %        RUL Breath Sounds: Clear, RML Breath Sounds: Diminished, RLL Breath Sounds: Diminished, TUCKER Breath Sounds: Clear, LLL Breath Sounds: Diminished    Physical Exam   Constitutional: She appears well-developed and well-nourished. No distress.   HENT:   Mouth/Throat: Oropharynx is clear and moist.   Oral mucous membranes slightly dry   Eyes: Conjunctivae are normal. Right eye exhibits no discharge. Left eye exhibits no discharge. No scleral icterus.   Neck: No JVD present. No tracheal deviation present.   Cardiovascular: Normal rate, regular rhythm and normal heart sounds.    Pulmonary/Chest: Effort normal and breath sounds normal. No stridor. No respiratory distress. She has no wheezes. She has no rales. She exhibits no tenderness.   Abdominal: Soft. Bowel sounds are normal. She exhibits no distension. There is no tenderness.   Musculoskeletal: She exhibits tenderness (left hip normal range of motion). She exhibits no edema.   Neurological: She is alert. No cranial nerve deficit. She exhibits normal muscle tone.   Skin: Skin is warm and dry. She is not diaphoretic. No pallor.   Psychiatric: She has a normal mood and affect. Her behavior is normal.   Nursing note and vitals reviewed.      Recent Labs      07/29/17   1635   WBC  10.4   RBC  4.83   HEMOGLOBIN  15.0   HEMATOCRIT  45.6   MCV  94.4   MCH  31.1   MCHC  32.9*   RDW  44.5   PLATELETCT  254   MPV  9.2     Recent Labs      07/29/17   1635   SODIUM  134*   POTASSIUM  3.8   CHLORIDE  104   CO2  24   GLUCOSE  91   BUN  34*   CREATININE  1.08   CALCIUM  9.5     Recent Labs      07/29/17   1635   ALTSGPT  15   ASTSGOT  17   ALKPHOSPHAT  67   TBILIRUBIN  0.4   GLUCOSE  91     Recent Labs      07/29/17   1635   APTT  23.3*             Lab Results   Component Value Date    TROPONINI 0.01 07/29/2017    urinalysis positive for  20 white blood cells and moderate leukocyte esterase    Imaging  Chest x-ray  Bilateral interstitial opacities, right greater than left may represent mild edema or pneumonitis superimposed on chronic fibrotic changes.  Atherosclerotic plaque.    Hip x-ray   Posttraumatic and postsurgical changes of the proximal left femur.    Degenerative changes of the hips bilaterally.    Degenerative changes in the visualized lower lumbar spine and sacroiliac joints.    Calcific densities adjacent to the greater trochanter of the right femur and right ischium can be seen in calcific tendinopathy.   Assessment/Plan     I anticipate this patient will require at least two midnights for appropriate medical management, necessitating inpatient admission.    HTN (hypertension) (present on admission)  Assessment & Plan  Continue home medications of amlodipine and lisinopril. Hold hydrochlorothiazide as this can cause incontinence and urinary tract infections to be worse in elderly patients. I would not recommend that she continue this at home.    Syncope (present on admission)  Assessment & Plan  Please to be secondary to volume depletion due to being on a diuretic and having a urinary tract infection in a advanced age patient. Monitor on telemetry overnight, no signs of coronary syndrome.    UTI (urinary tract infection) (present on admission)  Assessment & Plan  Treating with ceftriaxone. Follow cultures. She has no signs of sepsis on admission.    Hip pain, acute (present on admission)  Assessment & Plan  X-rays negative for fracture, patient is now able to bear weight has better range of motion and feels like the pain is resolving. Doubt occult fracture. Consider physical therapy evaluation.        VTE prophylaxis: Lovenox .
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
## 2017-07-30 NOTE — ED NOTES
Pt incontinent of urine while in CT. Pt cleaned up and encouraged to use call light if she needs to pee.

## 2017-07-30 NOTE — ED NOTES
Pt incontinent of urine again while at xray. Pt cleaned up.  CT results reviewed by AKASH. Britt rosario.

## 2017-07-30 NOTE — ED NOTES
"Unable to complete med rec at this time  Pt states she does not know what she takes  Pt has caretakers that come and put out her medications for her  No contact information for pt's caretakers  The pt states that \"Kelsey\" oversees all of the caretakers and she doesn't know  The name of the company they work for  Allergies reviewed with pt - NKJOSS  Pt denies ABX in last month    Pt fills her medications at [facility]. This pharmacy is   Closed until money - will follow up     "

## 2017-07-30 NOTE — CARE PLAN
Problem: Safety  Goal: Will remain free from injury  Intervention: Provide assistance with mobility  Pt. Ambulated to bathroom x 1 hand held assist. Pt unsteady and shuffle gait. Cueing required.       Problem: Knowledge Deficit  Goal: Knowledge of disease process/condition, treatment plan, diagnostic tests, and medications will improve  Intervention: Explain information regarding disease process/condition, treatment plan, diagnostic tests, and medications and document in education  Pt educated to the indication of IV antibiotics r/t UTI. Pt verbalized understanding.

## 2017-07-30 NOTE — PROGRESS NOTES
Shantel Ashley Fall Risk Assessment:     Last Known Fall: Within the last month  Mobility: Use of assistive device/requires assist of two people  Medications: No meds  Mental Status/LOC/Awareness: Awake, alert, and oriented to date, place, and person  Toileting Needs: Use of assistive device (Bedside commode, bedpan, urinal)  Volume/Electrolyte Status: Use of IV fluids/tube feeds  Communication/Sensory: Visual (Glasses)/hearing deficit  Behavior: Appropriate behavior  Shantel Ashley Fall Risk Total: 14  Fall Risk Level: MODERATE RISK    Universal Fall Precautions:  bed in low position and locked, wheelchairs and assistive devices out of sight, call light/belongings in reach, siderails up x 2, use non-slip footwear, adequate lighting, clutter free and spill free environment, educate to call for assistance, educate on level of risk    Fall Risk Level Interventions:    TRIAL (TELE 8, NEURO, MED JEREMIAH 5) Moderate Fall Risk Interventions  Place yellow fall risk ID band on patient: verified  Provide patient/family education based on risk assessment : verified  Educate patient/family to call staff for assistance when getting out of bed: verified  Place fall precaution signage outside patient door: verified  Utilize bed/chair fall alarm: verified     Patient Specific Interventions:     Medication: review medications with patient and family  Mental Status/LOC/Awareness: reinforce the use of call light  Toileting: provide frquent toileting  Volume/Electrolyte Status: monitor blood sugars and maintain appropriate blood sugar levels if diabetic and ensure IV fluids are appropriate  Communication/Sensory: ensure proper positioning when transferrng/ambulating  Behavioral: instruct/reinforce fall program rationale  Mobility: ensure bed is locked and in lowest position

## 2017-07-30 NOTE — ED NOTES
Pt asking for HOB down and blanket, both provided, denies further needs, call light within reach.

## 2017-07-30 NOTE — PROGRESS NOTES
[facility] Hospitalist Progress Note    Date of Service: 2017    Chief Complaint  89 y.o. female admitted 2017 with   Ground-level fall at home with syncopal episode no fracture has a UTI and is a little delirious.    Interval Problem Update  Feeling improved.     Consultants/Specialty  none    Disposition  Remain in hospital.   Full code??        Review of Systems   Constitutional: Negative for fever and chills.   HENT: Negative for sore throat.    Eyes: Negative for blurred vision and pain.   Respiratory: Negative for cough and shortness of breath.    Cardiovascular: Negative for chest pain and palpitations.   Gastrointestinal: Negative for nausea, vomiting and abdominal pain.   Genitourinary: Negative for dysuria and urgency.   Musculoskeletal: Negative for back pain and neck pain.   Skin: Negative for itching and rash.   Neurological: Negative for dizziness, tingling and headaches.   Psychiatric/Behavioral: Negative for depression. The patient does not have insomnia.    All other systems reviewed and are negative.     Physical Exam  Laboratory/Imaging   Hemodynamics  Temp (24hrs), Av.3 °C (97.3 °F), Min:35.9 °C (96.6 °F), Max:36.4 °C (97.6 °F)   Temperature: 36.4 °C (97.5 °F)  Pulse  Av.5  Min: 64  Max: 88 Heart Rate (Monitored): 72  Blood Pressure : (!) 165/66 mmHg (Rn informed), NIBP: (!) 177/63 mmHg      Respiratory      Respiration: 18, Pulse Oximetry: 93 %        RUL Breath Sounds: Clear, RML Breath Sounds: Diminished, RLL Breath Sounds: Diminished, TUCKER Breath Sounds: Clear, LLL Breath Sounds: Diminished    Fluids    Intake/Output Summary (Last 24 hours) at 17 0753  Last data filed at 17 0000   Gross per 24 hour   Intake    200 ml   Output      0 ml   Net    200 ml       Nutrition  Orders Placed This Encounter   Procedures   • Diet Order     Standing Status: Standing      Number of Occurrences: 1      Standing Expiration Date:      Order Specific Question:  Diet:     Answer:   Regular [1]     Physical Exam   Constitutional: She is oriented to person, place, and time. She appears well-developed and well-nourished. No distress.   HENT:   Right Ear: External ear normal.   Left Ear: External ear normal.   Nose: Nose normal.   Eyes: Conjunctivae are normal. Right eye exhibits no discharge. Left eye exhibits no discharge.   Neck: No JVD present.   Cardiovascular: Regular rhythm.    Murmur heard.  Pulmonary/Chest: Effort normal and breath sounds normal. No stridor. No respiratory distress. She has no wheezes. She has no rales.   Abdominal: Soft. Bowel sounds are normal. She exhibits no distension. There is no tenderness.   Musculoskeletal: She exhibits no edema or tenderness.   Neurological: She is alert and oriented to person, place, and time.   Skin: Skin is warm and dry. She is not diaphoretic. No erythema.   Psychiatric: She has a normal mood and affect. Her behavior is normal.   Nursing note and vitals reviewed.      Recent Labs      07/29/17   1635   WBC  10.4   RBC  4.83   HEMOGLOBIN  15.0   HEMATOCRIT  45.6   MCV  94.4   MCH  31.1   MCHC  32.9*   RDW  44.5   PLATELETCT  254   MPV  9.2     Recent Labs      07/29/17   1635   SODIUM  134*   POTASSIUM  3.8   CHLORIDE  104   CO2  24   GLUCOSE  91   BUN  34*   CREATININE  1.08   CALCIUM  9.5     Recent Labs      07/29/17   1635   APTT  23.3*                  Assessment/Plan     HTN (hypertension) (present on admission)  Assessment & Plan  Continue home medications of amlodipine and lisinopril. Hold hydrochlorothiazide as this can cause incontinence and urinary tract infections to be worse in elderly patients. I would not recommend that she continue this at home.    Syncope (present on admission)  Assessment & Plan  Please to be secondary to volume depletion due to being on a diuretic and having a urinary tract infection in a advanced age patient. Monitor on telemetry overnight, no signs of coronary syndrome.    UTI (urinary tract infection)  (present on admission)  Assessment & Plan  Treating with ceftriaxone. Follow cultures. She has no signs of sepsis on admission.    Hip pain, acute (present on admission)  Assessment & Plan  X-rays negative for fracture, patient is now able to bear weight has better range of motion and feels like the pain is resolving. Doubt occult fracture. Consider physical therapy evaluation.    EKG reviewed, Medications reviewed, Radiology images reviewed and Labs reviewed  Reyna catheter: No Reyna      DVT Prophylaxis: Heparin  DVT prophylaxis - mechanical: SCDs  Ulcer prophylaxis: Not indicated  Antibiotics: Treating active infection/contamination beyond 24 hours perioperative coverage  Assessed for rehab: Patient was assess for and/or received rehabilitation services during this hospitalization

## 2017-07-30 NOTE — PROGRESS NOTES
Assumed care of patient bedside report received from Carlton updated on POC, call light within reach and fall precautions in place. Bed locked and in lowest position. Patient instructed to call for assistance before getting out of bed. All questions answered, no other needs at this time.

## 2017-07-30 NOTE — PROGRESS NOTES
Pt arrived from ED and ambulated x1 hand held assist to the bed. PT unsteady and uses a walker at home. VSS. Monitor placed and verified by monitor room, SR 86. Pt oriented to room and unit policies. Pt verbalized understanding. Bed locked in low position with fall precautions in place and bed alarm on. Call light in place with bedside table in reach.

## 2017-07-30 NOTE — PROGRESS NOTES
Patient OOB to chair for breakfast, strip alarm in place. Call light within reach. No needs at this time.

## 2017-07-30 NOTE — ED PROVIDER NOTES
"ED Provider Note    Scribed for Andre Trotter D.O. by Miki Piznon. 7/29/2017  8:47 PM    Primary care provider: SANGEETA Maki  Means of arrival: Ambulance  History obtained from: Patient  History limited by: None    CHIEF COMPLAINT  Chief Complaint   Patient presents with   • Syncope     Pt states she walking in the kitchen and \"passed out and fell backwards\" reports she hit her head on the floor   • Hip Pain     left       HPI  Katerina Joseph is a 89 y.o. female who presents to the Emergency Department with complaint of syncopal episode of left hip pain. She states that she was walking in the kitchen and passed out landing on her left side. His short duration. She states after that she had slight pain to her left hip. She denies headache, back pain, abdominal pain, loss of sensation or strength in arms or legs, nausea, vomiting, dysuria, hematuria, hematochezia or melena. She states that she has not changed her medications recently. She complains of slight neck pain and posterior region REVIEW OF SYSTEMS  Pertinent positives include equal episode and slight left hip pain. Pertinent negatives include no loss of sensation or strength in arms or legs, nausea or vomiting, painful urination, black stool. All other systems reviewed and negative.    C.    PAST MEDICAL HISTORY  Past Medical History   Diagnosis Date   • Cancer    • Hypertension    • Stroke    • Urge incontinence of urine 11/11/2015       SURGICAL HISTORY  Past Surgical History   Procedure Laterality Date   • Colon resection  2000     resection for colon cancer  Dr Sheets   • Other cardiac surgery     • Stent placement  cardiac stent 2006?   • Hip cannulated screw  10/15/2012     Performed by Yoni Gill M.D. at SURGERY [facility]   • Hip orif  October 2012     left hip        SOCIAL HISTORY  Social History   Substance Use Topics   • Smoking status: Never Smoker    • Smokeless tobacco: Never Used   • Alcohol Use: No    " "  History   Drug Use No       FAMILY HISTORY  No family history on file.    CURRENT MEDICATIONS  Home Medications     Reviewed by Luis Quevedo (Pharmacy Tech) on 07/29/17 at 1839  Med List Status: Unable to Obtain    Medication Last Dose Status    acetaminophen (TYLENOL) 325 MG TABS prn Active    amlodipine (NORVASC) 2.5 MG Tab  Active    aspirin EC (ECOTRIN) 81 MG TBEC  Active    Calcium Carbonate 600 MG Tab  Active    Calcium Polycarbophil (FIBER) 625 MG TABS  Active    Cholecalciferol (VITAMIN D3) 3000 UNITS Tab  Active    docusate sodium (COLACE) 100 MG CAPS prn Active    hydrochlorothiazide (HYDRODIURIL) 25 MG Tab  Active    lisinopril (PRINIVIL) 20 MG Tab  Active    magnesium hydroxide (MILK OF MAGNESIA) 400 MG/5ML SUSP prn Active    Multiple Vitamins-Minerals (CENTRUM ADULTS PO)  Active    pantoprazole (PROTONIX) 20 MG tablet  Active    potassium chloride SA (K-DUR) 10 MEQ TBCR  Active                ALLERGIES  No Known Allergies    PHYSICAL EXAM   VITAL SIGNS: /86 mmHg  Pulse 72  Temp(Src) 36.4 °C (97.5 °F)  Resp 17  Ht 1.702 m (5' 7\")  Wt 79.379 kg (175 lb)  BMI 27.40 kg/m2  SpO2 100%  LMP 01/01/1955    Nursing notes and vitals reviewed.  Constitutional: Well developed, Well nourished, No acute distress, Non-toxic appearance.   Eyes: PERRLA, EOMI, Conjunctiva normal, No discharge.   Neck: Slight central spinous process tenderness in the lower cervical spine, no step-off deformity, slight paravertebral muscle spasm in the cervical spine  Cardiovascular: Normal heart rate, Normal rhythm, No murmurs, No rubs, No gallops.   Thorax & Lungs: No respiratory distress, No rales, No rhonchi, No wheezing, No chest tenderness.   Abdomen: Bowel sounds normal, Soft, No tenderness, No guarding, No rebound, No masses, No pulsatile masses.   Skin: Warm, Dry, No erythema, No rash.   Musculoskeletal: Intact distal pulses, No edema, No cyanosis, No clubbing. Good range of motion in all major joints. No " tenderness to palpation or major deformities noted, no CVA tenderness, no midline back tenderness.   Neurologic: No saddle anesthesia   Alert & oriented to month and age, Normal cognition, Cranial nerves II-XII are intact, No slurred speech, Negative finger to nose bilaterally, No pronator drift bilaterally,   strength 5/5 bilaterally, Leg raise strength 5/5 bilaterally, Plantarflexion strength 5/5 bilaterally, Dorsiflexion strength 5/5 bilaterally, Deep tendon reflexes 2/4 upper and lower extremities bilaterally, Sensation intact throughout, No Nystagmus.  Psychiatric: Affect normal for clinical presentation.    DIAGNOSTIC STUDIES/PROCEDURES    LABS  Results for orders placed or performed during the hospital encounter of 07/29/17   CBC WITH DIFFERENTIAL   Result Value Ref Range    WBC 10.4 4.8 - 10.8 K/uL    RBC 4.83 4.20 - 5.40 M/uL    Hemoglobin 15.0 12.0 - 16.0 g/dL    Hematocrit 45.6 37.0 - 47.0 %    MCV 94.4 81.4 - 97.8 fL    MCH 31.1 27.0 - 33.0 pg    MCHC 32.9 (L) 33.6 - 35.0 g/dL    RDW 44.5 35.9 - 50.0 fL    Platelet Count 254 164 - 446 K/uL    MPV 9.2 9.0 - 12.9 fL    Neutrophils-Polys 57.20 44.00 - 72.00 %    Lymphocytes 31.50 22.00 - 41.00 %    Monocytes 8.50 0.00 - 13.40 %    Eosinophils 1.90 0.00 - 6.90 %    Basophils 0.40 0.00 - 1.80 %    Immature Granulocytes 0.50 0.00 - 0.90 %    Nucleated RBC 0.00 /100 WBC    Neutrophils (Absolute) 5.93 2.00 - 7.15 K/uL    Lymphs (Absolute) 3.26 1.00 - 4.80 K/uL    Monos (Absolute) 0.88 (H) 0.00 - 0.85 K/uL    Eos (Absolute) 0.20 0.00 - 0.51 K/uL    Baso (Absolute) 0.04 0.00 - 0.12 K/uL    Immature Granulocytes (abs) 0.05 0.00 - 0.11 K/uL    NRBC (Absolute) 0.00 K/uL   TROPONIN   Result Value Ref Range    Troponin I 0.01 0.00 - 0.04 ng/mL   COMP METABOLIC PANEL   Result Value Ref Range    Sodium 134 (L) 135 - 145 mmol/L    Potassium 3.8 3.6 - 5.5 mmol/L    Chloride 104 96 - 112 mmol/L    Co2 24 20 - 33 mmol/L    Anion Gap 6.0 0.0 - 11.9    Glucose 91 65 - 99  mg/dL    Bun 34 (H) 8 - 22 mg/dL    Creatinine 1.08 0.50 - 1.40 mg/dL    Calcium 9.5 8.5 - 10.5 mg/dL    AST(SGOT) 17 12 - 45 U/L    ALT(SGPT) 15 2 - 50 U/L    Alkaline Phosphatase 67 30 - 99 U/L    Total Bilirubin 0.4 0.1 - 1.5 mg/dL    Albumin 3.9 3.2 - 4.9 g/dL    Total Protein 6.7 6.0 - 8.2 g/dL    Globulin 2.8 1.9 - 3.5 g/dL    A-G Ratio 1.4 g/dL   APTT   Result Value Ref Range    APTT 23.3 (L) 24.7 - 36.0 sec   LACTIC ACID   Result Value Ref Range    Lactic Acid 1.5 0.5 - 2.0 mmol/L   URINALYSIS CULTURE, IF INDICATED   Result Value Ref Range    Color Yellow     Character Clear     Specific Gravity 1.016 <1.035    Ph 7.0 5.0-8.0    Glucose Negative Negative mg/dL    Ketones Negative Negative mg/dL    Protein Negative Negative mg/dL    Bilirubin Negative Negative    Urobilinogen, Urine 0.2 Negative    Nitrite Negative Negative    Leukocyte Esterase Moderate (A) Negative    Occult Blood Moderate (A) Negative    Micro Urine Req Microscopic     Culture Indicated Yes UA Culture   MAGNESIUM   Result Value Ref Range    Magnesium 2.2 1.5 - 2.5 mg/dL   PHOSPHORUS   Result Value Ref Range    Phosphorus 3.1 2.5 - 4.5 mg/dL   ESTIMATED GFR   Result Value Ref Range    GFR If  57 (A) >60 mL/min/1.73 m 2    GFR If Non  47 (A) >60 mL/min/1.73 m 2   URINE MICROSCOPIC (W/UA)   Result Value Ref Range    WBC 10-20 (A) /hpf    RBC 5-10 (A) /hpf    Bacteria Negative None /hpf    Epithelial Cells Moderate (A) /hpf    Hyaline Cast 3-5 (A) /lpf   EKG (NOW)   Result Value Ref Range    Report       [facility] Emergency Dept.    Test Date:  2017  Pt Name:    JIMMY GARCIA                Department: ER  MRN:        [id number]                      Room:       [facility]  Gender:     F                            Technician: [id number]  :        [id number]                   Requested By:ALBA ORTA  Order #:    [id number]                    Tonio MD: ALBA ORTA,  DO    Measurements  Intervals                                Axis  Rate:       84                           P:          80  IN:         184                          QRS:        1  QRSD:       94                           T:          59  QT:         392  QTc:        464    Interpretive Statements  SINUS RHYTHM  Compared to ECG 10/15/2012 12:42:39  Ventricular premature complex(es) no longer present  Atrial abnormality no longer present    Electronically Signed On 7- 18:00:36 PDT by ALBA ORTA DO       All labs reviewed by me.    RADIOLOGY  DX-CHEST-LIMITED (1 VIEW)   Final Result      Bilateral interstitial opacities, right greater than left may represent mild edema or pneumonitis superimposed on chronic fibrotic changes.      Atherosclerotic plaque.      DX-HIP-COMPLETE - UNILATERAL 2+ LEFT   Final Result      Posttraumatic and postsurgical changes of the proximal left femur.      Degenerative changes of the hips bilaterally.      Degenerative changes in the visualized lower lumbar spine and sacroiliac joints.      Calcific densities adjacent to the greater trochanter of the right femur and right ischium can be seen in calcific tendinopathy.      CT-CSPINE WITHOUT PLUS RECONS   Final Result      1.  There is no acute fracture of the cervical spine.   2.  There is multilevel degenerative change throughout the mid and lower cervical spine.         CT-HEAD W/O   Final Result      No acute intracranial abnormality is identified.      Atrophy      There are periventricular and subcortical white matter changes present.  This finding is nonspecific and could be from previous small vessel ischemia, demyelination, or gliosis.      Paranasal sinus disease as above described.      Small amount of fluid in the mastoid air cells bilaterally.        The radiologist's interpretation of all radiological studies have been reviewed by me.    COURSE & MEDICAL DECISION MAKING  Pertinent Labs & Imaging studies  reviewed. (See chart for details)    4:21 PM - Patient seen and examined at bedside. Ordered Urine Culture, Urinalysis, Lactic Acid, APTT, CMP, Troponin, CBC, Phosphorus, Magnesium, Estimated GFR, CT-Head, DX-Hip, CT-CSpine, DX-Chest to evaluate her symptoms.    8:45 PM - Consulted with Dr. Chau, Hospitalist, who is aware of the patient and agrees to admit. Ordered Urine Microscopic.    [facility] 89 [facility] that presents with syncopal episode and ground level fall. The patient has no evidence of intracranial hemorrhage or cervical spine fracture. She has have evidence of a urinary tract infection and he was etiology of her fall. The patient received Rocephin IV. She has noticed sepsis or overwhelming toxicity. She did have complaint of left hip pain but on my evaluation she had full range of motion without difficulty, she no pinpoint tenderness and hip x-rays negative. The patient will be admitted to Dr. Chau for further evaluation and management of her syncopal episode and urinary tract infection. The patient has no evidence of ST or non-ST elevation myocardial infarction, no evidence of CVA and is not in alteplase candidate.    DISPOSITION:  Patient will be admitted to Dr. Chau, Hospitalist, in guarded condition.    FINAL IMPRESSION  Syncope  Urinary tract infection  Left hip pain   Miki LU (Scribe), am scribing for, and in the presence of, Andre Trotter D.O    Electronically signed by: Miki Pinzon (Renéeibe), 7/29/2017    Andre LU D.O. personally performed the services described in this documentation, as scribed by Miki Pinzon in my presence, and it is both accurate and complete.    The note accurately reflects work and decisions made by me.  Andre Trotter  7/29/2017  11:10 PM

## 2017-07-30 NOTE — ASSESSMENT & PLAN NOTE
Continue home medications of amlodipine and lisinopril. Hold hydrochlorothiazide as this can cause incontinence and urinary tract infections to be worse in elderly patients. I would not recommend that she continue this at home.

## 2017-07-31 LAB
ALBUMIN SERPL BCP-MCNC: 3.5 G/DL (ref 3.2–4.9)
ALBUMIN/GLOB SERPL: 1.3 G/DL
ALP SERPL-CCNC: 61 U/L (ref 30–99)
ALT SERPL-CCNC: 11 U/L (ref 2–50)
ANION GAP SERPL CALC-SCNC: 8 MMOL/L (ref 0–11.9)
AST SERPL-CCNC: 16 U/L (ref 12–45)
BASOPHILS # BLD AUTO: 0.5 % (ref 0–1.8)
BASOPHILS # BLD: 0.03 K/UL (ref 0–0.12)
BILIRUB SERPL-MCNC: 0.3 MG/DL (ref 0.1–1.5)
BUN SERPL-MCNC: 21 MG/DL (ref 8–22)
CALCIUM SERPL-MCNC: 8.7 MG/DL (ref 8.5–10.5)
CHLORIDE SERPL-SCNC: 107 MMOL/L (ref 96–112)
CO2 SERPL-SCNC: 24 MMOL/L (ref 20–33)
CREAT SERPL-MCNC: 0.93 MG/DL (ref 0.5–1.4)
EOSINOPHIL # BLD AUTO: 0.35 K/UL (ref 0–0.51)
EOSINOPHIL NFR BLD: 5.3 % (ref 0–6.9)
ERYTHROCYTE [DISTWIDTH] IN BLOOD BY AUTOMATED COUNT: 44.1 FL (ref 35.9–50)
GFR SERPL CREATININE-BSD FRML MDRD: 56 ML/MIN/1.73 M 2
GLOBULIN SER CALC-MCNC: 2.6 G/DL (ref 1.9–3.5)
GLUCOSE SERPL-MCNC: 93 MG/DL (ref 65–99)
HCT VFR BLD AUTO: 41.9 % (ref 37–47)
HGB BLD-MCNC: 13.9 G/DL (ref 12–16)
IMM GRANULOCYTES # BLD AUTO: 0.02 K/UL (ref 0–0.11)
IMM GRANULOCYTES NFR BLD AUTO: 0.3 % (ref 0–0.9)
LYMPHOCYTES # BLD AUTO: 2.02 K/UL (ref 1–4.8)
LYMPHOCYTES NFR BLD: 30.7 % (ref 22–41)
MCH RBC QN AUTO: 31.4 PG (ref 27–33)
MCHC RBC AUTO-ENTMCNC: 33.2 G/DL (ref 33.6–35)
MCV RBC AUTO: 94.8 FL (ref 81.4–97.8)
MONOCYTES # BLD AUTO: 0.69 K/UL (ref 0–0.85)
MONOCYTES NFR BLD AUTO: 10.5 % (ref 0–13.4)
NEUTROPHILS # BLD AUTO: 3.47 K/UL (ref 2–7.15)
NEUTROPHILS NFR BLD: 52.7 % (ref 44–72)
NRBC # BLD AUTO: 0 K/UL
NRBC BLD AUTO-RTO: 0 /100 WBC
PLATELET # BLD AUTO: 215 K/UL (ref 164–446)
PMV BLD AUTO: 9.2 FL (ref 9–12.9)
POTASSIUM SERPL-SCNC: 3.6 MMOL/L (ref 3.6–5.5)
PROT SERPL-MCNC: 6.1 G/DL (ref 6–8.2)
RBC # BLD AUTO: 4.42 M/UL (ref 4.2–5.4)
SODIUM SERPL-SCNC: 139 MMOL/L (ref 135–145)
WBC # BLD AUTO: 6.6 K/UL (ref 4.8–10.8)

## 2017-07-31 PROCEDURE — A9270 NON-COVERED ITEM OR SERVICE: HCPCS | Performed by: HOSPITALIST

## 2017-07-31 PROCEDURE — G8988 SELF CARE GOAL STATUS: HCPCS | Mod: CI

## 2017-07-31 PROCEDURE — 80053 COMPREHEN METABOLIC PANEL: CPT

## 2017-07-31 PROCEDURE — 700111 HCHG RX REV CODE 636 W/ 250 OVERRIDE (IP): Performed by: HOSPITALIST

## 2017-07-31 PROCEDURE — 36415 COLL VENOUS BLD VENIPUNCTURE: CPT

## 2017-07-31 PROCEDURE — 99233 SBSQ HOSP IP/OBS HIGH 50: CPT | Performed by: HOSPITALIST

## 2017-07-31 PROCEDURE — 700102 HCHG RX REV CODE 250 W/ 637 OVERRIDE(OP): Performed by: HOSPITALIST

## 2017-07-31 PROCEDURE — 700105 HCHG RX REV CODE 258: Performed by: HOSPITALIST

## 2017-07-31 PROCEDURE — 770020 HCHG ROOM/CARE - TELE (206)

## 2017-07-31 PROCEDURE — 97165 OT EVAL LOW COMPLEX 30 MIN: CPT

## 2017-07-31 PROCEDURE — G8987 SELF CARE CURRENT STATUS: HCPCS | Mod: CJ

## 2017-07-31 PROCEDURE — 85025 COMPLETE CBC W/AUTO DIFF WBC: CPT

## 2017-07-31 RX ORDER — POTASSIUM CHLORIDE 20 MEQ/1
20 TABLET, EXTENDED RELEASE ORAL DAILY
COMMUNITY

## 2017-07-31 RX ORDER — PANTOPRAZOLE SODIUM 40 MG/1
80 TABLET, DELAYED RELEASE ORAL EVERY EVENING
COMMUNITY

## 2017-07-31 RX ADMIN — ENOXAPARIN SODIUM 40 MG: 100 INJECTION SUBCUTANEOUS at 08:57

## 2017-07-31 RX ADMIN — AMLODIPINE BESYLATE 2.5 MG: 5 TABLET ORAL at 08:57

## 2017-07-31 RX ADMIN — LISINOPRIL 20 MG: 20 TABLET ORAL at 08:57

## 2017-07-31 RX ADMIN — CEFTRIAXONE 2 G: 2 INJECTION, POWDER, FOR SOLUTION INTRAMUSCULAR; INTRAVENOUS at 21:23

## 2017-07-31 RX ADMIN — LORAZEPAM 0.5 MG: 1 TABLET ORAL at 00:43

## 2017-07-31 RX ADMIN — ASPIRIN 81 MG: 81 TABLET ORAL at 08:57

## 2017-07-31 ASSESSMENT — ENCOUNTER SYMPTOMS
EYE PAIN: 0
NAUSEA: 0
CHILLS: 0
HEADACHES: 0
DIZZINESS: 0
TINGLING: 0
SHORTNESS OF BREATH: 0
DEPRESSION: 0
NECK PAIN: 0
ABDOMINAL PAIN: 0
COUGH: 0
PALPITATIONS: 0
BLURRED VISION: 0
FEVER: 0
INSOMNIA: 0
BACK PAIN: 0

## 2017-07-31 ASSESSMENT — COGNITIVE AND FUNCTIONAL STATUS - GENERAL
MOVING FROM LYING ON BACK TO SITTING ON SIDE OF FLAT BED: A LITTLE
TOILETING: A LOT
DRESSING REGULAR LOWER BODY CLOTHING: A LITTLE
PERSONAL GROOMING: A LITTLE
STANDING UP FROM CHAIR USING ARMS: A LOT
HELP NEEDED FOR BATHING: A LITTLE
MOBILITY SCORE: 16
PERSONAL GROOMING: A LITTLE
DAILY ACTIVITIY SCORE: 18
SUGGESTED CMS G CODE MODIFIER DAILY ACTIVITY: CJ
WALKING IN HOSPITAL ROOM: A LOT
HELP NEEDED FOR BATHING: A LITTLE
SUGGESTED CMS G CODE MODIFIER MOBILITY: CK
DAILY ACTIVITIY SCORE: 20
CLIMB 3 TO 5 STEPS WITH RAILING: A LOT
SUGGESTED CMS G CODE MODIFIER DAILY ACTIVITY: CK
MOVING TO AND FROM BED TO CHAIR: A LITTLE
TOILETING: A LITTLE
DRESSING REGULAR UPPER BODY CLOTHING: A LITTLE
DRESSING REGULAR LOWER BODY CLOTHING: A LITTLE

## 2017-07-31 ASSESSMENT — PAIN SCALES - GENERAL
PAINLEVEL_OUTOF10: 0
PAINLEVEL_OUTOF10: 0
PAINLEVEL_OUTOF10: 4
PAINLEVEL_OUTOF10: 0

## 2017-07-31 ASSESSMENT — ACTIVITIES OF DAILY LIVING (ADL): TOILETING: INDEPENDENT

## 2017-07-31 NOTE — PROGRESS NOTES
Report received by day shift RN. Pt sleeping in bed with 2 L O2 infusing. POC, labs and orders reveiwed. Bed locked in low position with fall precautions in place and bed alarm on. Call light in place with bed side table in reach.

## 2017-07-31 NOTE — THERAPY
"Occupational Therapy Evaluation completed.   Functional Status:  Pt seen today for OT Eval. Pt very pleasant and cooperative, [place]. Pt requires CGA for basic self cares and functional mobility/txfs with FWW. Pt reports mild fatigue after session. Pt limited by weakness and fatigue which impacts independence in ADLs and functional mobility.   Plan of Care: Will benefit from Occupational Therapy 3 times per week  Discharge Recommendations:  Equipment: No Equipment Needed. Discharge to home with outpatient or home health for additional skilled therapy services. Pt has a caregiver from 8-6:30pm 7 days a week.    See \"Rehab Therapy-Acute\" Patient Summary Report for complete documentation.    "

## 2017-07-31 NOTE — CARE PLAN
Problem: Safety  Goal: Will remain free from injury  Intervention: Provide assistance with mobility  Pt ambulates x1 with FWW. Bed alarm on. Call light in place with bedside table in reach.

## 2017-07-31 NOTE — CARE PLAN
Problem: Infection  Goal: Will remain free from infection  Outcome: PROGRESSING AS EXPECTED    Problem: Knowledge Deficit  Goal: Knowledge of disease process/condition, treatment plan, diagnostic tests, and medications will improve  Outcome: PROGRESSING AS EXPECTED    Problem: Mobility  Goal: Risk for activity intolerance will decrease  Outcome: PROGRESSING AS EXPECTED    Problem: Urinary Elimination:  Goal: Ability to reestablish a normal urinary elimination pattern will improve  Outcome: PROGRESSING AS EXPECTED

## 2017-07-31 NOTE — PROGRESS NOTES
The Medication Reconciliation process has been completed by calling the patients [facility], Rx meds updated, OTC cannot be verified.    Allergies have been reviewed  Antibiotic use in 30 days - none per pharmacy    [phone]

## 2017-07-31 NOTE — PROGRESS NOTES
[facility] Hospitalist Progress Note    Date of Service: 2017    Chief Complaint  89 y.o. female admitted 2017 with Ground-level fall at home with syncopal episode no fracture has a UTI complicated by delirium.    Interval Problem Update  Anxious this am. delirius after sleeping pill last night (ativan)  Feeling cold and poorly today.       Consultants/Specialty  none    Disposition  Remain in hospital.   Stop fluids    We discussed code status today and she would like to be a DNR  Physical occupational therapy consultations today to assess safety for home she has a caregiver that comes by 3 times a day at home        Review of Systems   Constitutional: Negative for fever and chills.   HENT: Negative for hearing loss.    Eyes: Negative for blurred vision and pain.   Respiratory: Negative for cough and shortness of breath.    Cardiovascular: Negative for chest pain and palpitations.   Gastrointestinal: Negative for nausea and abdominal pain.   Genitourinary: Negative for dysuria and urgency.   Musculoskeletal: Negative for back pain and neck pain.   Skin: Negative for itching and rash.   Neurological: Negative for dizziness, tingling and headaches.   Psychiatric/Behavioral: Negative for depression. The patient does not have insomnia.    All other systems reviewed and are negative.     Physical Exam  Laboratory/Imaging   Hemodynamics  Temp (24hrs), Av.4 °C (97.5 °F), Min:36 °C (96.8 °F), Max:37.2 °C (98.9 °F)   Temperature: 37.2 °C (98.9 °F)  Pulse  Av  Min: 55  Max: 88    Blood Pressure : 146/70 mmHg      Respiratory      Respiration: 16, Pulse Oximetry: 90 %        RUL Breath Sounds: Clear, RML Breath Sounds: Diminished, RLL Breath Sounds: Diminished, TUCKER Breath Sounds: Clear, LLL Breath Sounds: Diminished    Fluids    Intake/Output Summary (Last 24 hours) at 17 0814  Last data filed at 17 1940   Gross per 24 hour   Intake   1840 ml   Output      0 ml   Net   1840 ml        Nutrition  Orders Placed This Encounter   Procedures   • Diet Order     Standing Status: Standing      Number of Occurrences: 1      Standing Expiration Date:      Order Specific Question:  Diet:     Answer:  Regular [1]     Physical Exam   Constitutional: She is oriented to person, place, and time. She appears well-developed and well-nourished. No distress.   HENT:   Right Ear: External ear normal.   Left Ear: External ear normal.   Nose: Nose normal.   Eyes: Conjunctivae are normal. Right eye exhibits no discharge. Left eye exhibits no discharge.   Neck: No JVD present.   Cardiovascular: Regular rhythm.    Murmur heard.  Pulmonary/Chest: Effort normal and breath sounds normal. No stridor. No respiratory distress. She has no rales.   Abdominal: Soft. Bowel sounds are normal. She exhibits no distension. There is no tenderness.   Musculoskeletal: She exhibits no edema or tenderness.   Neurological: She is alert and oriented to person, place, and time.   Confused at times   Skin: Skin is warm and dry. She is not diaphoretic. No erythema.   Psychiatric: She has a normal mood and affect. Her behavior is normal.   Nursing note and vitals reviewed.      Recent Labs      07/29/17   1635  07/30/17   1642  07/31/17   0358   WBC  10.4  7.3  6.6   RBC  4.83  4.69  4.42   HEMOGLOBIN  15.0  14.6  13.9   HEMATOCRIT  45.6  44.6  41.9   MCV  94.4  95.1  94.8   MCH  31.1  31.1  31.4   MCHC  32.9*  32.7*  33.2*   RDW  44.5  45.3  44.1   PLATELETCT  254  132*  215   MPV  9.2  11.0  9.2     Recent Labs      07/29/17   1635  07/30/17   1642  07/31/17   0358   SODIUM  134*  134*  139   POTASSIUM  3.8  4.1  3.6   CHLORIDE  104  103  107   CO2  24  23  24   GLUCOSE  91  101*  93   BUN  34*  26*  21   CREATININE  1.08  1.14  0.93   CALCIUM  9.5  8.7  8.7     Recent Labs      07/29/17   1635   APTT  23.3*                  Assessment/Plan     HTN (hypertension) (present on admission)  Assessment & Plan  Continue home medications of  amlodipine and lisinopril. Hold hydrochlorothiazide as this can cause incontinence and urinary tract infections to be worse in elderly patients. I would not recommend that she continue this at home.    Syncope (present on admission)  Assessment & Plan  Please to be secondary to volume depletion due to being on a diuretic and having a urinary tract infection in a advanced age patient. Monitor on telemetry overnight, no signs of coronary syndrome.    UTI (urinary tract infection) (present on admission)  Assessment & Plan  Treating with ceftriaxone. Follow cultures. She has no signs of sepsis on admission.    Hip pain, acute (present on admission)  Assessment & Plan  X-rays negative for fracture, patient is now able to bear weight has better range of motion and feels like the pain is resolving. Doubt occult fracture. Consider physical therapy evaluation.      EKG reviewed, Medications reviewed, Radiology images reviewed and Labs reviewed  Reyna catheter: No Reyna      DVT Prophylaxis: Heparin  DVT prophylaxis - mechanical: SCDs  Ulcer prophylaxis: Not indicated  Antibiotics: Treating active infection/contamination beyond 24 hours perioperative coverage  Assessed for rehab: Patient was assess for and/or received rehabilitation services during this hospitalization

## 2017-07-31 NOTE — PROGRESS NOTES
Shantel Ashley Fall Risk Assessment:     Last Known Fall: Within the last month  Mobility: Use of assistive device/requires assist of two people  Medications: No meds  Mental Status/LOC/Awareness: Awake, alert, and oriented to date, place, and person  Toileting Needs: Use of assistive device (Bedside commode, bedpan, urinal)  Volume/Electrolyte Status: Use of IV fluids/tube feeds  Communication/Sensory: Visual (Glasses)/hearing deficit  Behavior: Appropriate behavior  Shantel Ashley Fall Risk Total: 14  Fall Risk Level: MODERATE RISK    Universal Fall Precautions:  call light/belongings in reach, bed in low position and locked, wheelchairs and assistive devices out of sight, siderails up x 2, use non-slip footwear, adequate lighting, clutter free and spill free environment, educate on level of risk, educate to call for assistance    Fall Risk Level Interventions:    TRIAL (TELE 8, NEURO, MED JEREMIAH 5) Moderate Fall Risk Interventions  Place yellow fall risk ID band on patient: verified  Provide patient/family education based on risk assessment : completed  Educate patient/family to call staff for assistance when getting out of bed: completed  Place fall precaution signage outside patient door: completed  Utilize bed/chair fall alarm: completed     Patient Specific Interventions:     Medication: review medications with patient and family and assess for medications that can be discontinued or dosage decreased  Mental Status/LOC/Awareness: reinforce falls education, check on patient hourly, utilize bed/chair fall alarm and provide activity  Toileting: provide frquent toileting, monitor intake and output/use of appropriate interventions, instruct patient/family on the need to call for assistance when toileting and do not leave patient unattended in bathroom/refer to toileting scripting  Volume/Electrolyte Status: ensure patient remains hydrated, monitor abnormal lab values and ensure IV fluids are  appropriate  Communication/Sensory: update plan of care on whiteboard and provide communication alternatives/  Behavioral: instruct/reinforce fall program rationale  Mobility: schedule physical activity throughout the day, dangle prior to standing, utilize bed/chair fall alarm, ensure bed is locked and in lowest position and provide appropriate assistive device

## 2017-08-01 ENCOUNTER — PATIENT OUTREACH (OUTPATIENT)
Dept: HEALTH INFORMATION MANAGEMENT | Facility: OTHER | Age: 82
End: 2017-08-01

## 2017-08-01 VITALS
DIASTOLIC BLOOD PRESSURE: 81 MMHG | TEMPERATURE: 97.9 F | HEART RATE: 76 BPM | SYSTOLIC BLOOD PRESSURE: 165 MMHG | RESPIRATION RATE: 16 BRPM | WEIGHT: 173.94 LBS | OXYGEN SATURATION: 89 % | BODY MASS INDEX: 27.3 KG/M2 | HEIGHT: 67 IN

## 2017-08-01 LAB
BACTERIA UR CULT: NORMAL
SIGNIFICANT IND 70042: NORMAL
SITE SITE: NORMAL
SOURCE SOURCE: NORMAL

## 2017-08-01 PROCEDURE — 700102 HCHG RX REV CODE 250 W/ 637 OVERRIDE(OP): Performed by: HOSPITALIST

## 2017-08-01 PROCEDURE — A9270 NON-COVERED ITEM OR SERVICE: HCPCS | Performed by: HOSPITALIST

## 2017-08-01 PROCEDURE — 700111 HCHG RX REV CODE 636 W/ 250 OVERRIDE (IP): Performed by: HOSPITALIST

## 2017-08-01 PROCEDURE — 99239 HOSP IP/OBS DSCHRG MGMT >30: CPT | Performed by: HOSPITALIST

## 2017-08-01 RX ORDER — CEFDINIR 300 MG/1
300 CAPSULE ORAL 2 TIMES DAILY
Qty: 4 CAP | Refills: 0 | Status: SHIPPED | OUTPATIENT
Start: 2017-08-01 | End: 2017-08-03

## 2017-08-01 RX ADMIN — AMLODIPINE BESYLATE 2.5 MG: 5 TABLET ORAL at 09:20

## 2017-08-01 RX ADMIN — ENOXAPARIN SODIUM 40 MG: 100 INJECTION SUBCUTANEOUS at 09:20

## 2017-08-01 RX ADMIN — LISINOPRIL 20 MG: 20 TABLET ORAL at 09:20

## 2017-08-01 RX ADMIN — ASPIRIN 81 MG: 81 TABLET ORAL at 09:20

## 2017-08-01 ASSESSMENT — COGNITIVE AND FUNCTIONAL STATUS - GENERAL
HELP NEEDED FOR BATHING: A LITTLE
CLIMB 3 TO 5 STEPS WITH RAILING: A LOT
DAILY ACTIVITIY SCORE: 20
MOVING TO AND FROM BED TO CHAIR: A LITTLE
PERSONAL GROOMING: A LITTLE
STANDING UP FROM CHAIR USING ARMS: A LOT
MOBILITY SCORE: 16
DRESSING REGULAR LOWER BODY CLOTHING: A LITTLE
WALKING IN HOSPITAL ROOM: A LOT
TOILETING: A LITTLE
SUGGESTED CMS G CODE MODIFIER MOBILITY: CK
SUGGESTED CMS G CODE MODIFIER DAILY ACTIVITY: CJ
MOVING FROM LYING ON BACK TO SITTING ON SIDE OF FLAT BED: A LITTLE

## 2017-08-01 ASSESSMENT — PATIENT HEALTH QUESTIONNAIRE - PHQ9
2. FEELING DOWN, DEPRESSED, IRRITABLE, OR HOPELESS: NOT AT ALL
SUM OF ALL RESPONSES TO PHQ QUESTIONS 1-9: 0
1. LITTLE INTEREST OR PLEASURE IN DOING THINGS: NOT AT ALL
SUM OF ALL RESPONSES TO PHQ9 QUESTIONS 1 AND 2: 0

## 2017-08-01 ASSESSMENT — PAIN SCALES - GENERAL
PAINLEVEL_OUTOF10: 0
PAINLEVEL_OUTOF10: 0

## 2017-08-01 NOTE — DISCHARGE INSTRUCTIONS
Discharge Instructions    Discharged to home by car with friend. Discharged via wheelchair, hospital escort: Yes.  Special equipment needed: Not Applicable    Be sure to schedule a follow-up appointment with your primary care doctor or any specialists as instructed.     Discharge Plan:   Diet Plan: Discussed  Activity Level: Discussed  Confirmed Follow up Appointment: Appointment Scheduled  Confirmed Symptoms Management: Discussed  Medication Reconciliation Updated: Yes  Influenza Vaccine Indication: Patient Refuses    I understand that a diet low in cholesterol, fat, and sodium is recommended for good health. Unless I have been given specific instructions below for another diet, I accept this instruction as my diet prescription.   Other diet: low sodium, low cholesterol    Special Instructions: None    · Is patient discharged on Warfarin / Coumadin?   No     · Is patient Post Blood Transfusion?  No    Depression / Suicide Risk    As you are discharged from this [facility] facility, it is important to learn how to keep safe from harming yourself.    Recognize the warning signs:  · Abrupt changes in personality, positive or negative- including increase in energy   · Giving away possessions  · Change in eating patterns- significant weight changes-  positive or negative  · Change in sleeping patterns- unable to sleep or sleeping all the time   · Unwillingness or inability to communicate  · Depression  · Unusual sadness, discouragement and loneliness  · Talk of wanting to die  · Neglect of personal appearance   · Rebelliousness- reckless behavior  · Withdrawal from people/activities they love  · Confusion- inability to concentrate     If you or a loved one observes any of these behaviors or has concerns about self-harm, here's what you can do:  · Talk about it- your feelings and reasons for harming yourself  · Remove any means that you might use to hurt yourself (examples: pills, rope, extension cords,  firearm)  · Get professional help from the community (Mental Health, Substance Abuse, psychological counseling)  · Do not be alone:Call your Safe Contact- someone whom you trust who will be there for you.  · Call your local CRISIS HOTLINE 172-6164 or 974-695-1165  · Call your local Children's Mobile Crisis Response Team [place] (802) 560-2027 or www.[employer]  · Call the toll free National Suicide Prevention Hotlines   · National Suicide Prevention Lifeline 636-469-DETG (9418)  · [place] Line Network 800-SUICIDE (754-1895)      Cefdinir capsules  What is this medicine?  CEFDINIR (SEF di ner) is a cephalosporin antibiotic. It is used to treat certain kinds of bacterial infections. It will not work for colds, flu, or other viral infections.  This medicine may be used for other purposes; ask your health care provider or pharmacist if you have questions.  COMMON BRAND NAME(S): Omnicef  What should I tell my health care provider before I take this medicine?  They need to know if you have any of these conditions:  -bleeding problems  -kidney disease  -stomach or intestine problems (especially colitis)  -an unusual or allergic reaction to cefdinir, other cephalosporin antibiotics, penicillin, penicillamine, other foods, dyes or preservatives  -pregnant or trying to get pregnant  -breast-feeding  How should I use this medicine?  Take this medicine by mouth. Swallow it with a drink of water. Follow the directions on the prescription label. You can take it with or without food. If it upsets your stomach it may help to take it with food. Take your doses at regular intervals. Do not take it more often than directed. Finish all the medicine you are prescribed even if you think your infection is better.  Talk to your pediatrician regarding the use of this medicine in children. Special care may be needed.  Overdosage: If you think you have taken too much of this medicine contact a poison control center or  emergency room at once.  NOTE: This medicine is only for you. Do not share this medicine with others.  What if I miss a dose?  If you miss a dose, take it as soon as you can. If it is almost time for your next dose, take only that dose. Do not take double or extra doses.  What may interact with this medicine?  -antacids that contain aluminum or magnesium  -iron supplements  -other antibiotics  -probenecid  This list may not describe all possible interactions. Give your health care provider a list of all the medicines, herbs, non-prescription drugs, or dietary supplements you use. Also tell them if you smoke, drink alcohol, or use illegal drugs. Some items may interact with your medicine.  What should I watch for while using this medicine?  Tell your doctor or health care professional if your symptoms do not get better in a few days.  If you are diabetic you may get a false-positive result for sugar in your urine. Check with your doctor or health care professional before you change your diet or the dose of your diabetes medicine.  What side effects may I notice from receiving this medicine?  Side effects that you should report to your doctor or health care professional as soon as possible:  -allergic reactions like skin rash, itching or hives, swelling of the face, lips, or tongue  -breathing problems  -fever or chills  -redness, blistering, peeling or loosening of the skin, including inside the mouth  -seizures  -severe or watery diarrhea  -sore throat  -swollen joints  -trouble passing urine or change in the amount of urine  -unusual bleeding or bruising  -unusually weak or tired  Side effects that usually do not require medical attention (report to your doctor or health care professional if they continue or are bothersome):  -constipation  -dizziness  -gas or heartburn  -headache  -loss of appetite  -nausea or vomiting  -stomach pain  -stool discoloration  -vaginal itching  This list may not describe all possible  side effects. Call your doctor for medical advice about side effects. You may report side effects to FDA at 8-708-NMV-1233.  Where should I keep my medicine?  Keep out of the reach of children.  Store at room temperature between 15 and 30 degrees C (59 and 86 degrees F). Throw the medicine away after the expiration date.  NOTE: This sheet is a summary. It may not cover all possible information. If you have questions about this medicine, talk to your doctor, pharmacist, or health care provider.  © 2014, [employer]vier/[employer]. (2/27/2009 4:02:53 PM)

## 2017-08-01 NOTE — DOCUMENTATION QUERY
DOCUMENTATION QUERY    PROVIDERS: Please select “Cosign w/ note”to reply to query.    To better represent the severity of illness of your patient, please review the following information and exercise your independent professional judgment in responding to this query.     Unspecified Delirium/ Confusion in setting of an eldelry 89yoF admitted after GLF Syncopal Episode found to have  UTI/ HTN/  is documented in the 7/31 Progress Notes. Based upon the clinical findings, risk factors, and treatment, can this diagnosis be further specified?    •  Suspected Probable Acute encephalopathy (if so, please specify type [metabolic, hepatic, toxic, alcoholic,                                                                              etc.])  ( Present on Admission? Developed After Admission? )  • Suspected Probable Acute Encephalopathy Multifactorial  • Suspected Probable Hypertensive Encephalopathy  • Acute Psychosis (if so, please specify type [acute hysterical, alcoholic, etc.])  • AcuteDelirium    Without Acute Encephalopathy   • Normal Developmental Behavior for a 89 year old  • Other Explanations  • Unable to determine           The medical record reflects the following:   Clinical Findings  ---per 7/31 Hospitalist Notes: pt. CONFUSED AT TIMES, Anxious, Delirious after ATIVAN, STOP FLUIDS    ---per 7/30 Hospitalist Notes: UTI/ Delirioius, feeling improved                                  BP from 165/66 -- 173/63 resp 18 93%,   --per 7/29 CT_HEAD:   Atrophy, possible prior ischemia, paransal sinus disease, small fluid in mastoid    Treatment IVFNS at 75, ROCEPHIN, NORVASC, ASA,  LOvenox, PRINIVIL  Labs, VS, Neuro checks, CT HEAD, Fall Precautions  Medical management of UTI   Risk Factors  eldelry 89yoF with History of Stroke admitted after sustaining Ground-level Fall without Fractures, Syncopal episode found to have UTI,  Delirious episodes and bouts of confusion while admitted   Location within medical record  Progress  Notes and Radiology Results     Thank you,   Yoan Bah , [facility]  [facility]   [phone]

## 2017-08-01 NOTE — PROGRESS NOTES
· Chart reviewed.  Patient is scheduled for care manager telephone visit today 8/1/17 at 4:00 PM, however currently admitted to [facility].  Placed phone call to Sue in scheduling to request that care manager telephone visit and discharge clinic appt be canceled and/or rescheduled.

## 2017-08-01 NOTE — FACE TO FACE
Face to Face Supporting Documentation - Home Health    The encounter with this patient was in whole or in part the primary reason for home health admission.    Date of encounter:   Patient:                    MRN:                       YOB: 2017  Katerina Joseph  [id number]  2/13/1919     Home health to see patient for:  Physical Therapy evaluation and treatment and Occupational therapy evaluation and treatment    Skilled need for:  Recent Deterioration of Health Status frequent falls    Skilled nursing interventions to include:  Comment: skilled in home PT/OT    Homebound status evidenced by:  Need the aid of supportive devices such as crutches, canes, wheelchairs or walkers or Needs the assistance of another person in order to leave the home. Leaving home requires a considerable and taxing effort. There is a normal inability to leave the home.    Community Physician to provide follow up care: SANGEETA Maki     Optional Interventions? No      I certify the face to face encounter for this home health care referral meets the CMS requirements and the encounter/clinical assessment with the patient was, in whole, or in part, for the medical condition(s) listed above, which is the primary reason for home health care. Based on my clinical findings: the service(s) are medically necessary, support the need for home health care, and the homebound criteria are met.  I certify that this patient has had a face to face encounter by myself.  Shasta Simeon M.D. - DESMONDI: [phone]

## 2017-08-01 NOTE — PROGRESS NOTES
Shantel Ashley Fall Risk Assessment:     Last Known Fall: Within the last month  Mobility: Use of assistive device/requires assist of two people  Medications: No meds  Mental Status/LOC/Awareness: Awake, alert, and oriented to date, place, and person  Toileting Needs: Use of assistive device (Bedside commode, bedpan, urinal)  Volume/Electrolyte Status: Use of IV fluids/tube feeds  Communication/Sensory: Visual (Glasses)/hearing deficit  Behavior: Appropriate behavior  Shantel Ashley Fall Risk Total: 14  Fall Risk Level: MODERATE RISK    Universal Fall Precautions:  bed in low position and locked, call light/belongings in reach, wheelchairs and assistive devices out of sight, siderails up x 2, use non-slip footwear, adequate lighting, clutter free and spill free environment, educate on level of risk, educate to call for assistance    Fall Risk Level Interventions:    TRIAL (TELE 8, NEURO, MED JEREMIAH 5) Moderate Fall Risk Interventions  Place yellow fall risk ID band on patient: verified  Provide patient/family education based on risk assessment : verified  Educate patient/family to call staff for assistance when getting out of bed: verified  Place fall precaution signage outside patient door: verified  Utilize bed/chair fall alarm: verified     Patient Specific Interventions:     Medication: review medications with patient and family and assess for medications that can be discontinued or dosage decreased  Mental Status/LOC/Awareness: reorient patient, reinforce falls education, check on patient hourly, utilize bed/chair fall alarm, reinforce the use of call light and provide activity  Toileting: consider obtaining elevated toilet seat or bedside commode (BSC), provide frquent toileting, do not leave patient unattended in bathroom/refer to toileting scripting and toilet prior to giving pain medications  Volume/Electrolyte Status: ensure patient remains hydrated and monitor abnormal lab values  Communication/Sensory:  update plan of care on whiteboard, ensure proper positioning when transferrng/ambulating and ensure patient has glasses/contacts and hearing aids/dentures  Behavioral: administer medication as ordered and instruct/reinforce fall program rationale  Mobility: utilize bed/chair fall alarm, ensure bed is locked and in lowest position, provide appropriate assistive device, instruct patient to exit bed on their strongest side and collaborate with doctor for possible PT/OT consult

## 2017-08-01 NOTE — PROGRESS NOTES
Report received by day shift RN. Pt sitting up in the chair awake alert and oriented x4. Pt updated to POC and verbalized understanding. Bed locked in low position with fall precautions in place and bed alarm on. Call light in place with bed side table in reach.

## 2017-08-01 NOTE — CARE PLAN
Problem: Discharge Barriers/Planning  Goal: Patient’s continuum of care needs will be met  Intervention: Involve patient and significant other/support system in setting and prioritizing goals for hospital stay and discharge  Pt educated to POC and possible discharge in am. Pt verbalized understanding and expressed concern of ride.Pt reassured as conversation with care giver occurred r/t discharge.

## 2017-08-01 NOTE — PROGRESS NOTES
Shantel Ashley Fall Risk Assessment:     Last Known Fall: Within the last month  Mobility: Use of assistive device/requires assist of two people  Medications: No meds  Mental Status/LOC/Awareness: Awake, alert, and oriented to date, place, and person  Toileting Needs: Use of assistive device (Bedside commode, bedpan, urinal)  Volume/Electrolyte Status: Use of IV fluids/tube feeds  Communication/Sensory: Visual (Glasses)/hearing deficit  Behavior: Appropriate behavior  Shantel Ashley Fall Risk Total: 14  Fall Risk Level: MODERATE RISK    Universal Fall Precautions:  call light/belongings in reach, bed in low position and locked, wheelchairs and assistive devices out of sight, siderails up x 2, use non-slip footwear, adequate lighting, clutter free and spill free environment, educate on level of risk, educate to call for assistance    Fall Risk Level Interventions:    TRIAL (TELE 8, NEURO, MED JEREMIAH 5) Moderate Fall Risk Interventions  Place yellow fall risk ID band on patient: verified  Provide patient/family education based on risk assessment : verified  Educate patient/family to call staff for assistance when getting out of bed: verified  Place fall precaution signage outside patient door: verified  Utilize bed/chair fall alarm: verified     Patient Specific Interventions:     Medication: review medications with patient and family and provide patients who received diuretics/laxatives frequent toileting  Mental Status/LOC/Awareness: reorient patient  Toileting: provide frquent toileting  Volume/Electrolyte Status: ensure IV fluids are appropriate  Communication/Sensory: for visually impaired patients orient to their room surrounding and do not change their surroundings  Behavioral: engage patient in daily activities  Mobility: schedule physical activity throughout the day

## 2017-08-01 NOTE — DISCHARGE SUMMARY
"CHIEF COMPLAINT ON ADMISSION  Chief Complaint   Patient presents with   • Syncope     Pt states she walking in the kitchen and \"passed out and fell backwards\" reports she hit her head on the floor   • Hip Pain     left       CODE STATUS  DNR    HPI & HOSPITAL COURSE  This is a 89 y.o. female here with ground-level fall with associated syncopal episode. Upon assessment in the ER, the patient was noted to have a urinary tract infection as well as mild delirium. She was admitted to the hospital for further workup of her syncope and treatment of her UTI. He monitored her urine cultures and they have remained negative. She was started on empiric ceftriaxone and then transitioned to oral Omnicef which she tolerated. Patient had no evidence of delirium by the date of discharge. Her syncope was deemed secondary to volume depletion in the setting of her urinary tract infection. We monitored closely on telemetry with no evidence of cardiac dysrhythmias. Additionally, troponin levels were monitored and were unrevealing. CT of the head showed no acute intracranial injury. The patient was seen by physical therapy and occupational therapy and she has been cleared for discharge home. She is however quite elderly and would benefit from outpatient physical therapy. Unfortunately we cannot arrange for home physical therapy as she lives in [place] and is currently hospitalized in [place]. Therefore I have given her a prescription for outpatient physical therapy and she will follow up with her primary care provider in [place] to make these arrangements.    Therefore, she is discharged in fair and stable condition with close outpatient follow-up.    SPECIFIC OUTPATIENT FOLLOW-UP  Primary care provider in one to 2 days    DISCHARGE PROBLEM LIST  Active Problems:    HTN (hypertension) POA: Yes    Syncope POA: Yes    UTI (urinary tract infection) POA: Yes    Hip pain, acute POA: Yes  Resolved Problems:    * No resolved hospital " problems. *      FOLLOW UP  Future Appointments  Date Time Provider Department Center   8/2/2017 2:40 PM CARE MANAGER [facility]   8/3/2017 1:40 PM Ally Macdonald M.D. [facility]     No follow-up provider specified.    MEDICATIONS ON DISCHARGE   Katerina Joseph   Home Medication Instructions ANANTH:[id number]    Printed on:08/01/17 1041   Medication Information                      acetaminophen (TYLENOL) 325 MG TABS  Take 650 mg by mouth every four hours as needed.    Indications: Pain             amlodipine (NORVASC) 2.5 MG Tab  TAKE 1 TABLET BY MOUTH ONCE DAILY.             aspirin EC (ECOTRIN) 81 MG TBEC  Take 81 mg by mouth every day.               Calcium Carbonate 600 MG Tab  Take  by mouth.             Calcium Polycarbophil (FIBER) 625 MG TABS  Take 1 Tab by mouth every day.             Cholecalciferol (VITAMIN D3) 3000 UNITS Tab  Take 1,000 Units by mouth every day.             docusate sodium (COLACE) 100 MG CAPS  Take 100 mg by mouth 2 times a day.             hydrochlorothiazide (HYDRODIURIL) 25 MG Tab  TAKE 1 TABLET BY MOUTH ONCE DAILY.             lisinopril (PRINIVIL) 20 MG Tab  TAKE (1) TABLET BY MOUTH TWICE DAILY.             magnesium hydroxide (MILK OF MAGNESIA) 400 MG/5ML SUSP  Take 30 mL by mouth 1 time daily as needed. For constipation.              Multiple Vitamins-Minerals (CENTRUM ADULTS PO)  Take  by mouth.             pantoprazole (PROTONIX) 40 MG Tablet Delayed Response  Take 80 mg by mouth every evening.             potassium chloride SA (KDUR) 20 MEQ Tab CR  Take 20 mEq by mouth every day.                 DIET  Orders Placed This Encounter   Procedures   • Diet Order     Standing Status: Standing      Number of Occurrences: 1      Standing Expiration Date:      Order Specific Question:  Diet:     Answer:  Regular [1]       ACTIVITY  As tolerated.  Weight bearing as tolerated      CONSULTATIONS  None    PROCEDURES  None    LABORATORY  Lab Results   Component Value Date/Time    SODIUM 139  07/31/2017 03:58 AM    POTASSIUM 3.6 07/31/2017 03:58 AM    CHLORIDE 107 07/31/2017 03:58 AM    CO2 24 07/31/2017 03:58 AM    GLUCOSE 93 07/31/2017 03:58 AM    BUN 21 07/31/2017 03:58 AM    CREATININE 0.93 07/31/2017 03:58 AM        Lab Results   Component Value Date/Time    WBC 6.6 07/31/2017 03:58 AM    HEMOGLOBIN 13.9 07/31/2017 03:58 AM    HEMATOCRIT 41.9 07/31/2017 03:58 AM    PLATELET COUNT 215 07/31/2017 03:58 AM        Total time of the discharge process exceeds 36 minutes

## 2017-08-01 NOTE — PROGRESS NOTES
Patient discharged home, IV and tele box removed, discharge instructions provided to patient and reviewed with them. All questions answered, patient provided copy of discharge instructions and instructed on when to F/U with MD. Patient wheeled off unit. Patient discharged into the care of Kelsey Box, identification verified by 's license.

## 2017-08-02 ENCOUNTER — PATIENT OUTREACH (OUTPATIENT)
Dept: HEALTH INFORMATION MANAGEMENT | Facility: OTHER | Age: 82
End: 2017-08-02

## 2018-09-04 ENCOUNTER — OFFICE VISIT (OUTPATIENT)
Dept: MEDICAL GROUP | Facility: PHYSICIAN GROUP | Age: 83
End: 2018-09-04
Payer: MEDICARE

## 2018-09-04 VITALS
WEIGHT: 172 LBS | BODY MASS INDEX: 27 KG/M2 | TEMPERATURE: 98.6 F | OXYGEN SATURATION: 92 % | HEART RATE: 68 BPM | DIASTOLIC BLOOD PRESSURE: 60 MMHG | RESPIRATION RATE: 18 BRPM | SYSTOLIC BLOOD PRESSURE: 122 MMHG | HEIGHT: 67 IN

## 2018-09-04 DIAGNOSIS — M25.512 CHRONIC LEFT SHOULDER PAIN: ICD-10-CM

## 2018-09-04 DIAGNOSIS — K21.9 GASTROESOPHAGEAL REFLUX DISEASE, ESOPHAGITIS PRESENCE NOT SPECIFIED: ICD-10-CM

## 2018-09-04 DIAGNOSIS — I10 ESSENTIAL HYPERTENSION: ICD-10-CM

## 2018-09-04 DIAGNOSIS — Z85.038 PERSONAL HISTORY OF COLON CANCER: ICD-10-CM

## 2018-09-04 DIAGNOSIS — G89.29 CHRONIC LEFT SHOULDER PAIN: ICD-10-CM

## 2018-09-04 PROCEDURE — 99214 OFFICE O/P EST MOD 30 MIN: CPT | Performed by: FAMILY MEDICINE

## 2018-09-04 RX ORDER — AMLODIPINE BESYLATE 2.5 MG/1
TABLET ORAL
Qty: 90 TAB | Refills: 3 | Status: SHIPPED | OUTPATIENT
Start: 2018-09-04 | End: 2019-09-03 | Stop reason: SDUPTHER

## 2018-09-04 RX ORDER — LISINOPRIL 20 MG/1
TABLET ORAL
Qty: 180 TAB | Refills: 3 | Status: SHIPPED | OUTPATIENT
Start: 2018-09-04 | End: 2020-05-04 | Stop reason: SDUPTHER

## 2018-09-04 RX ORDER — HYDROCHLOROTHIAZIDE 25 MG/1
TABLET ORAL
Qty: 90 TAB | Refills: 3 | Status: SHIPPED | OUTPATIENT
Start: 2018-09-04 | End: 2019-09-05 | Stop reason: SDUPTHER

## 2018-09-04 ASSESSMENT — PATIENT HEALTH QUESTIONNAIRE - PHQ9: CLINICAL INTERPRETATION OF PHQ2 SCORE: 0

## 2018-09-04 NOTE — LETTER
[facility]  SANGEETA Maki  [address]  [facility] [address]  Fax: [phone]   Authorization for Release/Disclosure of   Protected Health Information   Name: JIMMY JOSEPH : 1919 SSN: [id number]   Address: [address]  [place] [id number] Phone:    [phone] (home)    I authorize the entity listed below to release/disclose the PHI below to:   [facility]/SANGEETA Maki and Yoni Marino M.D.   Provider or Entity Name:  [facility]   Address   City, State, Zip   Phone:      Fax:     Reason for request: continuity of care   Information to be released:    [  ] LAST COLONOSCOPY,  including any PATH REPORT and follow-up  [  ] LAST FIT/COLOGUARD RESULT [  ] LAST DEXA  [  ] LAST MAMMOGRAM  [  ] LAST PAP  [  ] LAST LABS [  ] RETINA EXAM REPORT  [  ] IMMUNIZATION RECORDS  [ X ] Release all info since       [  ] Check here and initial the line next to each item to release ALL health information INCLUDING  _____ Care and treatment for drug and / or alcohol abuse  _____ HIV testing, infection status, or AIDS  _____ Genetic Testing    DATES OF SERVICE OR TIME PERIOD TO BE DISCLOSED: _____________  I understand and acknowledge that:  * This Authorization may be revoked at any time by you in writing, except if your health information has already been used or disclosed.  * Your health information that will be used or disclosed as a result of you signing this authorization could be re-disclosed by the recipient. If this occurs, your re-disclosed health information may no longer be protected by State or Federal laws.  * You may refuse to sign this Authorization. Your refusal will not affect your ability to obtain treatment.  * This Authorization becomes effective upon signing and will  on (date) __________.      If no date is indicated, this Authorization will  one (1) year from the signature date.    Name: Jimmy Joseph    Signature:   Date:     2018       PLEASE FAX  REQUESTED RECORDS BACK TO: [phone]

## 2018-09-04 NOTE — PROGRESS NOTES
This 89-year-old female comes in complaining of  left shoulder pain which radiates up toward her neck and also down her arm.  Tylenol helps some but not completely.  The patient has no shortness of breath.  She says sometimes the pain from her left shoulder radiates over into her left anterior chest but not with physical activity.  The patient had a cortisone shot in the left shoulder done in [place] but it did not really help.  She is due for refills of some of her blood pressure medications.  She has a remote past history of colon cancer in 2000.  She had surgery for this and did well with it.    I reviewed the following    Past Medical History:   Diagnosis Date   • Cancer (HCC)    • Hypertension    • Stroke (HCC)    • Urge incontinence of urine 11/11/2015        Past Surgical History:   Procedure Laterality Date   • HIP CANNULATED SCREW  10/15/2012    Performed by Yoni Gill M.D. at SUR[facility] ORS   • HIP ORIF  October 2012    left hip   • COLON RESECTION  2000    resection for colon cancer  Dr Sheets   • OTHER CARDIAC SURGERY     • STENT PLACEMENT  cardiac stent 2006?       No Known Allergies    Current Outpatient Prescriptions   Medication Sig Dispense Refill   • Diclofenac Sodium 1 % Gel Apply 2 g to skin as directed 4 times a day. Use on left shoulder and neck 100 g 2   • hydroCHLOROthiazide (HYDRODIURIL) 25 MG Tab TAKE 1 TABLET BY MOUTH ONCE DAILY. 90 Tab 3   • amLODIPine (NORVASC) 2.5 MG Tab TAKE 1 TABLET BY MOUTH ONCE DAILY. 90 Tab 3   • lisinopril (PRINIVIL) 20 MG Tab TAKE (1) TABLET BY MOUTH TWICE DAILY. 180 Tab 3   • pantoprazole (PROTONIX) 40 MG Tablet Delayed Response Take 80 mg by mouth every evening.     • potassium chloride SA (KDUR) 20 MEQ Tab CR Take 20 mEq by mouth every day.     • Cholecalciferol (VITAMIN D3) 3000 UNITS Tab Take 1,000 Units by mouth every day.     • Multiple Vitamins-Minerals (CENTRUM ADULTS PO) Take  by mouth.     • Calcium Carbonate 600 MG Tab Take  by mouth.      • Calcium Polycarbophil (FIBER) 625 MG TABS Take 1 Tab by mouth every day. 100 Tab 3   • aspirin EC (ECOTRIN) 81 MG TBEC Take 81 mg by mouth every day.       • acetaminophen (TYLENOL) 325 MG TABS Take 650 mg by mouth every four hours as needed.    Indications: Pain     • docusate sodium (COLACE) 100 MG CAPS Take 100 mg by mouth 2 times a day.     • magnesium hydroxide (MILK OF MAGNESIA) 400 MG/5ML SUSP Take 30 mL by mouth 1 time daily as needed. For constipation.        No current facility-administered medications for this visit.         History reviewed. No pertinent family history.    Social History     Social History   • Marital status:      Spouse name: N/A   • Number of children: N/A   • Years of education: N/A     Occupational History   • Not on file.     Social History Main Topics   • Smoking status: Never Smoker   • Smokeless tobacco: Never Used   • Alcohol use No   • Drug use: No   • Sexual activity: No      Comment:      Other Topics Concern   • Not on file     Social History Narrative   • No narrative on file      The patient is well-developed well-nourished and in no acute distress--patient is sitting in a wheelchair.  She is somewhat hard of hearing.    Pupils equally round and reactive to light    Ears normal    Nose normal    Throat clear    Neck supple no cervical adenopathy no thyromegaly    Chest clear to auscultation    Heart regular rhythm no murmur S3 or S4 appreciated    Back no CVA pain or spasm    Abdomen flat soft good bowel sounds no mass No hepatosplenomegaly no rebound    Skin no rashes or suspicious lesions    DTRs 2+ in ankles knees and biceps    Babinski downgoing bilaterally    Pulses 2+ in all 4 extremities--left shoulder is 2+ tender especially with lateral extension.  She is tender over the superior part of the joint--impingement sign is positive.  I looked at her most recent chest x-ray and she did have what appeared to be narrowing of the joint space in her  shoulders.  I can assume that at age 89 she has arthritis.    1. Chronic left shoulder pain  Diclofenac Sodium 1 % Gel--2 g up to 4 times daily to left shoulder and neck as needed   2. Personal history of colon cancer   doing well   3. Gastroesophageal reflux disease, esophagitis presence not specified   stable   4. Essential hypertension--controlled hydroCHLOROthiazide (HYDRODIURIL) 25 MG Tab    amLODIPine (NORVASC) 2.5 MG Tab    lisinopril (PRINIVIL) 20 MG Tab     Please note that this dictation was created using voice recognition software. I have worked with consultants from the vendor as well as technical experts from [employer] [employer] to optimize the interface. I have made every reasonable attempt to correct obvious errors, but I expect that there are errors of grammar and possibly content that I did not discover before finalizing the note.    Recheck 6 months or as needed

## 2019-09-03 DIAGNOSIS — I10 ESSENTIAL HYPERTENSION: ICD-10-CM

## 2019-09-03 RX ORDER — AMLODIPINE BESYLATE 2.5 MG/1
TABLET ORAL
Qty: 90 TAB | Refills: 0 | Status: SHIPPED | OUTPATIENT
Start: 2019-09-03 | End: 2019-12-03 | Stop reason: SDUPTHER

## 2019-09-05 DIAGNOSIS — I10 ESSENTIAL HYPERTENSION: ICD-10-CM

## 2019-09-05 NOTE — TELEPHONE ENCOUNTER
Was the patient seen in the last year in this department? No  LOV 09/04/18 No upcoming angela    Does patient have an active prescription for medications requested? No     Received Request Via: Pharmacy

## 2019-09-06 RX ORDER — HYDROCHLOROTHIAZIDE 25 MG/1
TABLET ORAL
Qty: 90 TAB | Refills: 0 | Status: SHIPPED | OUTPATIENT
Start: 2019-09-06 | End: 2019-12-03 | Stop reason: SDUPTHER

## 2019-10-30 RX ORDER — LISINOPRIL 20 MG/1
TABLET ORAL
Qty: 180 TAB | Refills: 1 | Status: SHIPPED | OUTPATIENT
Start: 2019-10-30

## 2019-10-30 NOTE — TELEPHONE ENCOUNTER
Was the patient seen in the last year in this department? No lov 8/4/18    Does patient have an active prescription for medications requested? No     Received Request Via: Pharmacy

## 2019-12-03 DIAGNOSIS — I10 ESSENTIAL HYPERTENSION: ICD-10-CM

## 2019-12-03 RX ORDER — HYDROCHLOROTHIAZIDE 25 MG/1
TABLET ORAL
Qty: 90 TAB | Refills: 1 | Status: SHIPPED | OUTPATIENT
Start: 2019-12-03

## 2019-12-03 RX ORDER — AMLODIPINE BESYLATE 2.5 MG/1
TABLET ORAL
Qty: 90 TAB | Refills: 1 | Status: SHIPPED | OUTPATIENT
Start: 2019-12-03

## 2019-12-03 NOTE — TELEPHONE ENCOUNTER
Was the patient seen in the last year in this department? No   Last seen 9/4/2018   Does patient have an active prescription for medications requested? No     Received Request Via: Pharmacy

## 2020-05-04 DIAGNOSIS — I10 ESSENTIAL HYPERTENSION: ICD-10-CM

## 2020-05-04 RX ORDER — LISINOPRIL 20 MG/1
TABLET ORAL
Qty: 180 TAB | Refills: 0 | Status: SHIPPED | OUTPATIENT
Start: 2020-05-04

## 2020-05-04 NOTE — TELEPHONE ENCOUNTER
Received request via: Pharmacy    Was the patient seen in the last year in this department? No  LOV 09/04/2018 LABS 07/30/2017    Does the patient have an active prescription (recently filled or refills available) for medication(s) requested? No

## 2023-06-19 NOTE — PROGRESS NOTES
· Chart reviewed.  Discharge clinic appt was canceled per pt's caregiver on 8/2/17 at 9:51 AM.  No discharge outreach phone call placed to patient.   conversion of non tunneled to tunneled HD catheter Right Percutaneous Nephrostomy Non-tunnelled dialysis catheter